# Patient Record
Sex: FEMALE | Race: WHITE | Employment: FULL TIME | URBAN - METROPOLITAN AREA
[De-identification: names, ages, dates, MRNs, and addresses within clinical notes are randomized per-mention and may not be internally consistent; named-entity substitution may affect disease eponyms.]

---

## 2017-03-30 ENCOUNTER — OFFICE VISIT (OUTPATIENT)
Dept: OBGYN CLINIC | Facility: CLINIC | Age: 52
End: 2017-03-30

## 2017-03-30 ENCOUNTER — TELEPHONE (OUTPATIENT)
Dept: OBGYN CLINIC | Facility: CLINIC | Age: 52
End: 2017-03-30

## 2017-03-30 DIAGNOSIS — N39.0 URINARY TRACT INFECTION, SITE UNSPECIFIED: Primary | ICD-10-CM

## 2017-03-30 LAB
APPEARANCE: CLEAR
MULTISTIX LOT#: NORMAL NUMERIC
PH, URINE: 7 (ref 4.5–8)
SPECIFIC GRAVITY: 1.02 (ref 1–1.03)
URINE-COLOR: YELLOW
UROBILINOGEN,SEMI-QN: 0.2 MG/DL (ref 0–1.9)

## 2017-03-30 PROCEDURE — 81002 URINALYSIS NONAUTO W/O SCOPE: CPT | Performed by: OBSTETRICS & GYNECOLOGY

## 2017-03-30 PROCEDURE — 87086 URINE CULTURE/COLONY COUNT: CPT | Performed by: OBSTETRICS & GYNECOLOGY

## 2017-03-30 PROCEDURE — 87186 SC STD MICRODIL/AGAR DIL: CPT | Performed by: OBSTETRICS & GYNECOLOGY

## 2017-03-30 PROCEDURE — 87088 URINE BACTERIA CULTURE: CPT | Performed by: OBSTETRICS & GYNECOLOGY

## 2017-03-30 RX ORDER — NITROFURANTOIN 25; 75 MG/1; MG/1
100 CAPSULE ORAL 2 TIMES DAILY
Qty: 14 CAPSULE | Refills: 0 | Status: SHIPPED | OUTPATIENT
Start: 2017-03-30 | End: 2017-04-06

## 2017-04-14 ENCOUNTER — OFFICE VISIT (OUTPATIENT)
Dept: FAMILY MEDICINE CLINIC | Facility: CLINIC | Age: 52
End: 2017-04-14

## 2017-04-14 VITALS
RESPIRATION RATE: 12 BRPM | WEIGHT: 120 LBS | HEIGHT: 64.75 IN | TEMPERATURE: 99 F | OXYGEN SATURATION: 95 % | SYSTOLIC BLOOD PRESSURE: 128 MMHG | HEART RATE: 72 BPM | DIASTOLIC BLOOD PRESSURE: 88 MMHG | BODY MASS INDEX: 20.24 KG/M2

## 2017-04-14 DIAGNOSIS — Z13.0 SCREENING FOR IRON DEFICIENCY ANEMIA: ICD-10-CM

## 2017-04-14 DIAGNOSIS — Z01.84 IMMUNITY STATUS TESTING: ICD-10-CM

## 2017-04-14 DIAGNOSIS — Z00.00 ROUTINE GENERAL MEDICAL EXAMINATION AT A HEALTH CARE FACILITY: Primary | ICD-10-CM

## 2017-04-14 DIAGNOSIS — Z13.29 SCREENING FOR ENDOCRINE, NUTRITIONAL, METABOLIC AND IMMUNITY DISORDER: ICD-10-CM

## 2017-04-14 DIAGNOSIS — Z13.228 SCREENING FOR ENDOCRINE, NUTRITIONAL, METABOLIC AND IMMUNITY DISORDER: ICD-10-CM

## 2017-04-14 DIAGNOSIS — Z12.11 SCREENING FOR MALIGNANT NEOPLASM OF COLON: ICD-10-CM

## 2017-04-14 DIAGNOSIS — Z13.0 SCREENING FOR ENDOCRINE, NUTRITIONAL, METABOLIC AND IMMUNITY DISORDER: ICD-10-CM

## 2017-04-14 DIAGNOSIS — Z13.6 SCREENING FOR HEART DISEASE: ICD-10-CM

## 2017-04-14 DIAGNOSIS — Z13.21 SCREENING FOR ENDOCRINE, NUTRITIONAL, METABOLIC AND IMMUNITY DISORDER: ICD-10-CM

## 2017-04-14 PROCEDURE — 99396 PREV VISIT EST AGE 40-64: CPT | Performed by: FAMILY MEDICINE

## 2017-04-14 PROCEDURE — 90715 TDAP VACCINE 7 YRS/> IM: CPT | Performed by: FAMILY MEDICINE

## 2017-04-14 PROCEDURE — 90471 IMMUNIZATION ADMIN: CPT | Performed by: FAMILY MEDICINE

## 2017-04-14 RX ORDER — PYRIDOXINE HCL (VITAMIN B6) 100 MG
1 TABLET ORAL DAILY
COMMUNITY
End: 2020-11-14

## 2017-04-14 RX ORDER — GARLIC EXTRACT 500 MG
1 CAPSULE ORAL DAILY
COMMUNITY

## 2017-04-14 RX ORDER — MULTIVITAMIN
1 TABLET ORAL DAILY
COMMUNITY

## 2017-04-14 NOTE — PATIENT INSTRUCTIONS
Please follow-up with:    Dr. Ruvalcaba Seen,  [facility]   Douglas Munguia, [id number]  [address], [address] [place] [address]    Phone: [phone]  Fax: [phone].       Pre © 8376-9767 The [address] [place] [address], [address]. All rights reserved. This information is not intended as a substitute for professional medical care. Always follow your healthcare professional's instructions.         Prevent Certain factors can speed up bone loss or decrease bone growth. For example, a lack of activity makes bones lose their strength. Exercise plays a big part in maintaining bone mass, no matter what your age.  The amount and type of activity you do also play a Cervical cancer All women in this age group, except women who have had a complete hysterectomy Pap test every 3 years or Pap test with human papillomavirus (HPV) test every 5 years   Chlamydia Women at increased risk for infection At routine exams   Colore Hepatitis B Women at increased risk for infection – talk with your healthcare provider 3 doses over 6 months; second dose should be given 1 month after the first dose; the third dose should be given at least 2 months after the second dose and at least 4 mo Use of daily aspirin Women ages 54 and up in this age group who are at risk for cardiovascular health problems such as stroke When your risk is known   Use of tobacco and the health effects it can cause All women in this age group Every exam   1Amerjohn Ca · Being a  baby  Vitamin D has many effects in the body.  You may need this test to help your provider diagnose or treat:  · Problems with the parathyroid gland  · Cancer  · Autoimmune diseases such as multiple sclerosis and Crohn's disease  · Psor The amount of time you spend in the sunlight, your diet, and whether you take vitamin D in supplement form can affect your vitamin D levels. Ask your healthcare provider if any health conditions you have or medicines you take could affect your results.   Shanta Drew · Tell your healthcare provider about any medicines you take. Also tell him or her about any health conditions you may have. · Make sure your rectum and colon are empty for the test. Follow the diet and bowel prep instructions exactly.  If you don’t, the t A camera attached to a flexible tube with a viewing lens is used to take video pictures.      Colonoscopy is a test to view the inside of your lower digestive tract (colon and rectum). Sometimes it can show the last part of the small intestine (ileum). Luz Elena · The healthcare provider will first give you a physical exam to check for anal and rectal problems. · Then the anus is lubricated and the scope inserted. · If you are awake, you may have a feeling similar to needing to have a bowel movement.  You may als

## 2017-04-14 NOTE — PROGRESS NOTES
REASON FOR VISIT:    Rebekah Vincent is a 46year old female who presents for an [address]. No complaints.     Sees Ob/Gyn-Druzak had NL pap 2016 reported- sign release  NL mammogram 10/2016     Last menses 16 was regular prio immunizations at another office such as Influenza, Hepatitis B, Tetanus, or Pneumococcal?: No    Domestic Abuse: No     CAGE:     Cut : No    Annoyed : No    Guilty : No    Eye Opener : No    Scoring  Total Score: 0     Depression Screening (PHQ-2/PHQ-9): No results found for: HCVAB    Tuberculosis Screen if high risk No components found for: PPDINDURAT      Disease Monitoring:    SPECIFIC DISEASE MONITORING Internal Lab or Procedure External Lab or Procedure   Annual Monitoring of Persistent     Medication Smokeless Status: Never Used                        Alcohol Use: Yes           2.4 oz/week       4 Glasses of wine, 0 Standard drinks or equivalent per week       Comment: 3glasses wine q weekend or less    Occ:    :  not dating       REVIEW 1. Routine general medical examination at a health care facility  Healthy-weight  -Encourage healthy diet of whole food and avoid processed food and sugary drinks and sodas.   Diet should include lean meats and vegetables including 5-7 servings of fruit a

## 2017-05-25 ENCOUNTER — MED REC SCAN ONLY (OUTPATIENT)
Dept: OBGYN CLINIC | Facility: CLINIC | Age: 52
End: 2017-05-25

## 2017-07-07 ENCOUNTER — LAB ENCOUNTER (OUTPATIENT)
Dept: LAB | Age: 52
End: 2017-07-07
Attending: FAMILY MEDICINE
Payer: COMMERCIAL

## 2017-07-07 DIAGNOSIS — Z13.29 SCREENING FOR ENDOCRINE, NUTRITIONAL, METABOLIC AND IMMUNITY DISORDER: ICD-10-CM

## 2017-07-07 DIAGNOSIS — Z01.84 IMMUNITY STATUS TESTING: ICD-10-CM

## 2017-07-07 DIAGNOSIS — Z13.0 SCREENING FOR IRON DEFICIENCY ANEMIA: ICD-10-CM

## 2017-07-07 DIAGNOSIS — Z13.21 SCREENING FOR ENDOCRINE, NUTRITIONAL, METABOLIC AND IMMUNITY DISORDER: ICD-10-CM

## 2017-07-07 DIAGNOSIS — Z13.228 SCREENING FOR ENDOCRINE, NUTRITIONAL, METABOLIC AND IMMUNITY DISORDER: ICD-10-CM

## 2017-07-07 DIAGNOSIS — Z00.00 ROUTINE GENERAL MEDICAL EXAMINATION AT A HEALTH CARE FACILITY: ICD-10-CM

## 2017-07-07 DIAGNOSIS — Z13.0 SCREENING FOR ENDOCRINE, NUTRITIONAL, METABOLIC AND IMMUNITY DISORDER: ICD-10-CM

## 2017-07-07 LAB
25-HYDROXYVITAMIN D (TOTAL): 34.6 NG/ML (ref 30–100)
ALBUMIN SERPL-MCNC: 4 G/DL (ref 3.5–4.8)
ALP LIVER SERPL-CCNC: 51 U/L (ref 41–108)
ALT SERPL-CCNC: 22 U/L (ref 14–54)
AST SERPL-CCNC: 18 U/L (ref 15–41)
BASOPHILS # BLD AUTO: 0.08 X10(3) UL (ref 0–0.1)
BASOPHILS NFR BLD AUTO: 1.3 %
BILIRUB SERPL-MCNC: 0.4 MG/DL (ref 0.1–2)
BUN BLD-MCNC: 13 MG/DL (ref 8–20)
CALCIUM BLD-MCNC: 9 MG/DL (ref 8.3–10.3)
CHLORIDE: 104 MMOL/L (ref 101–111)
CO2: 28 MMOL/L (ref 22–32)
CREAT BLD-MCNC: 0.85 MG/DL (ref 0.55–1.02)
EOSINOPHIL # BLD AUTO: 0.1 X10(3) UL (ref 0–0.3)
EOSINOPHIL NFR BLD AUTO: 1.6 %
ERYTHROCYTE [DISTWIDTH] IN BLOOD BY AUTOMATED COUNT: 12.2 % (ref 11.5–16)
GLUCOSE BLD-MCNC: 80 MG/DL (ref 70–99)
HCT VFR BLD AUTO: 40.1 % (ref 34–50)
HGB BLD-MCNC: 13.4 G/DL (ref 12–16)
IMMATURE GRANULOCYTE COUNT: 0.01 X10(3) UL (ref 0–1)
IMMATURE GRANULOCYTE RATIO %: 0.2 %
LYMPHOCYTES # BLD AUTO: 1.11 X10(3) UL (ref 0.9–4)
LYMPHOCYTES NFR BLD AUTO: 18.1 %
M PROTEIN MFR SERPL ELPH: 7.6 G/DL (ref 6.1–8.3)
MCH RBC QN AUTO: 32.2 PG (ref 27–33.2)
MCHC RBC AUTO-ENTMCNC: 33.4 G/DL (ref 31–37)
MCV RBC AUTO: 96.4 FL (ref 81–100)
MONOCYTES # BLD AUTO: 0.4 X10(3) UL (ref 0.1–0.6)
MONOCYTES NFR BLD AUTO: 6.5 %
NEUTROPHIL ABS PRELIM: 4.44 X10 (3) UL (ref 1.3–6.7)
NEUTROPHILS # BLD AUTO: 4.44 X10(3) UL (ref 1.3–6.7)
NEUTROPHILS NFR BLD AUTO: 72.3 %
PLATELET # BLD AUTO: 212 10(3)UL (ref 150–450)
POTASSIUM SERPL-SCNC: 3.9 MMOL/L (ref 3.6–5.1)
RBC # BLD AUTO: 4.16 X10(6)UL (ref 3.8–5.1)
RED CELL DISTRIBUTION WIDTH-SD: 43.1 FL (ref 35.1–46.3)
RUBELLA IGG QUANTITATIVE: >500 IU/ML
RUBV IGG SER QL: POSITIVE
SODIUM SERPL-SCNC: 139 MMOL/L (ref 136–144)
TSI SER-ACNC: 1.72 MIU/ML (ref 0.35–5.5)
WBC # BLD AUTO: 6.1 X10(3) UL (ref 4–13)

## 2017-07-07 PROCEDURE — 80050 GENERAL HEALTH PANEL: CPT | Performed by: FAMILY MEDICINE

## 2017-07-07 PROCEDURE — 36415 COLL VENOUS BLD VENIPUNCTURE: CPT | Performed by: FAMILY MEDICINE

## 2017-07-07 PROCEDURE — 86765 RUBEOLA ANTIBODY: CPT | Performed by: FAMILY MEDICINE

## 2017-07-07 PROCEDURE — 82306 VITAMIN D 25 HYDROXY: CPT | Performed by: FAMILY MEDICINE

## 2017-07-07 PROCEDURE — 86735 MUMPS ANTIBODY: CPT | Performed by: FAMILY MEDICINE

## 2017-07-07 PROCEDURE — 86762 RUBELLA ANTIBODY: CPT | Performed by: FAMILY MEDICINE

## 2017-07-11 LAB — MEV IGG SER IA-ACNC: POSITIVE

## 2017-07-12 LAB — MUV IGG SER IA-ACNC: POSITIVE

## 2017-08-22 ENCOUNTER — OFFICE VISIT (OUTPATIENT)
Dept: OBGYN CLINIC | Facility: CLINIC | Age: 52
End: 2017-08-22

## 2017-08-22 VITALS
RESPIRATION RATE: 16 BRPM | WEIGHT: 124 LBS | HEIGHT: 65 IN | SYSTOLIC BLOOD PRESSURE: 126 MMHG | HEART RATE: 72 BPM | DIASTOLIC BLOOD PRESSURE: 72 MMHG | BODY MASS INDEX: 20.66 KG/M2

## 2017-08-22 DIAGNOSIS — N39.0 URINARY TRACT INFECTION WITHOUT HEMATURIA, SITE UNSPECIFIED: Primary | ICD-10-CM

## 2017-08-22 LAB
APPEARANCE: CLEAR
BILIRUBIN: NEGATIVE
GLUCOSE (URINE DIPSTICK): NEGATIVE MG/DL
KETONES (URINE DIPSTICK): NEGATIVE MG/DL
LEUKOCYTES: NEGATIVE
MULTISTIX LOT#: NORMAL NUMERIC
NITRITE, URINE: NEGATIVE
PH, URINE: 7 (ref 4.5–8)
PROTEIN (URINE DIPSTICK): NEGATIVE MG/DL
SPECIFIC GRAVITY: 1.01 (ref 1–1.03)
URINE-COLOR: YELLOW
UROBILINOGEN,SEMI-QN: 0.2 MG/DL (ref 0–1.9)

## 2017-08-22 PROCEDURE — 99213 OFFICE O/P EST LOW 20 MIN: CPT | Performed by: OBSTETRICS & GYNECOLOGY

## 2017-08-22 PROCEDURE — 81003 URINALYSIS AUTO W/O SCOPE: CPT | Performed by: OBSTETRICS & GYNECOLOGY

## 2017-08-22 PROCEDURE — 87086 URINE CULTURE/COLONY COUNT: CPT | Performed by: OBSTETRICS & GYNECOLOGY

## 2017-08-22 RX ORDER — NITROFURANTOIN MACROCRYSTALS 50 MG/1
50 CAPSULE ORAL NIGHTLY
Qty: 30 CAPSULE | Refills: 4 | Status: SHIPPED | OUTPATIENT
Start: 2017-08-22 | End: 2017-10-10

## 2017-08-22 NOTE — PROGRESS NOTES
She is having bladder infections every 3-4 weeks. She test home urine it comes up positive. Bactrim does not work for her anymore she uses Cipro. Urine culture will be sent. We discussed antibiotic suppression with Macrobid which she desires.   Also Ben Montiel

## 2017-08-25 ENCOUNTER — MED REC SCAN ONLY (OUTPATIENT)
Dept: OBGYN CLINIC | Facility: CLINIC | Age: 52
End: 2017-08-25

## 2017-10-10 ENCOUNTER — OFFICE VISIT (OUTPATIENT)
Dept: OBGYN CLINIC | Facility: CLINIC | Age: 52
End: 2017-10-10

## 2017-10-10 VITALS
SYSTOLIC BLOOD PRESSURE: 124 MMHG | BODY MASS INDEX: 19.99 KG/M2 | HEIGHT: 65 IN | HEART RATE: 80 BPM | WEIGHT: 120 LBS | DIASTOLIC BLOOD PRESSURE: 74 MMHG

## 2017-10-10 DIAGNOSIS — Z01.419 WELL WOMAN EXAM WITH ROUTINE GYNECOLOGICAL EXAM: Primary | ICD-10-CM

## 2017-10-10 DIAGNOSIS — Z30.41 ENCOUNTER FOR SURVEILLANCE OF CONTRACEPTIVE PILLS: ICD-10-CM

## 2017-10-10 PROCEDURE — 99396 PREV VISIT EST AGE 40-64: CPT | Performed by: OBSTETRICS & GYNECOLOGY

## 2017-10-10 RX ORDER — NITROFURANTOIN MACROCRYSTALS 50 MG/1
50 CAPSULE ORAL NIGHTLY
Qty: 30 CAPSULE | Refills: 4 | Status: SHIPPED | OUTPATIENT
Start: 2017-10-10 | End: 2018-10-23

## 2017-10-10 RX ORDER — NORETHINDRONE ACETATE AND ETHINYL ESTRADIOL AND FERROUS FUMARATE 1MG-20(24)
1 KIT ORAL DAILY
Qty: 90 TABLET | Refills: 4 | Status: SHIPPED | OUTPATIENT
Start: 2017-10-10 | End: 2017-12-13

## 2017-10-10 NOTE — PROGRESS NOTES
Colton Madrigal is a 46year old female  Patient's last menstrual period was 2017 (exact date). Patient presents with:  Wellness Visit  . She is on the birth control pill. She usually does not have a. She did have one last year.   Has hot f • Diabetes Father    • High Blood Pressure Mother    • Heart Disease Paternal Grandfather    • Other[other] [OTHER] Maternal Grandmother      osteoporosis       MEDICATIONS:    Current Outpatient Prescriptions:   •  Nitrofurantoin Macrocrystal (MACRODANT Normal appearance without lesions, no abnormal discharge good estrogen. Cervix:  Normal without tenderness on motion without lesions. Uterus: normal in size, contour, position, mobility, without tenderness no lesions.   Adnexa: normal without masses or te

## 2017-10-16 ENCOUNTER — TELEPHONE (OUTPATIENT)
Dept: OBGYN CLINIC | Facility: CLINIC | Age: 52
End: 2017-10-16

## 2017-10-16 NOTE — TELEPHONE ENCOUNTER
Pt is requesting generic version of Minastrin ( Mibelaf chewable)  sent to pharm on file.  Pt was in for her wwe on 10-

## 2017-11-07 ENCOUNTER — PATIENT MESSAGE (OUTPATIENT)
Dept: OBGYN CLINIC | Facility: CLINIC | Age: 52
End: 2017-11-07

## 2017-11-07 DIAGNOSIS — Z12.31 VISIT FOR SCREENING MAMMOGRAM: Primary | ICD-10-CM

## 2017-11-07 NOTE — TELEPHONE ENCOUNTER
From: Vale Ends  To: Rogerio Bañuelos MD  Sent: 11/7/2017 1:25 PM CST  Subject: Other    I need to set up appt for my annual mammogram but I need an order set up in the system so I can schedule with the main hospital. Please let me know if we can do t

## 2017-12-09 ENCOUNTER — HOSPITAL ENCOUNTER (OUTPATIENT)
Dept: MAMMOGRAPHY | Facility: HOSPITAL | Age: 52
Discharge: HOME | End: 2017-12-09
Attending: OBSTETRICS & GYNECOLOGY
Payer: COMMERCIAL

## 2017-12-09 DIAGNOSIS — Z12.31 VISIT FOR SCREENING MAMMOGRAM: ICD-10-CM

## 2017-12-09 PROCEDURE — 77067 SCR MAMMO BI INCL CAD: CPT | Performed by: OBSTETRICS & GYNECOLOGY

## 2017-12-13 RX ORDER — NORETHINDRONE ACETATE, ETHINYL ESTRADIOL AND FERROUS FUMARATE 1MG-20(24)
KIT ORAL
Qty: 84 TABLET | Refills: 3 | Status: SHIPPED | OUTPATIENT
Start: 2017-12-13 | End: 2018-11-28

## 2017-12-21 ENCOUNTER — HOSPITAL ENCOUNTER (OUTPATIENT)
Dept: MAMMOGRAPHY | Age: 52
Discharge: HOME | End: 2017-12-21
Attending: OBSTETRICS & GYNECOLOGY
Payer: COMMERCIAL

## 2017-12-21 ENCOUNTER — HOSPITAL ENCOUNTER (OUTPATIENT)
Dept: ULTRASOUND IMAGING | Age: 52
Discharge: HOME | End: 2017-12-21
Attending: OBSTETRICS & GYNECOLOGY
Payer: COMMERCIAL

## 2017-12-21 DIAGNOSIS — R92.8 ABNORMAL MAMMOGRAM: ICD-10-CM

## 2017-12-21 DIAGNOSIS — R92.8 ABNORMAL MAMMOGRAM OF LEFT BREAST: ICD-10-CM

## 2017-12-21 PROCEDURE — 77065 DX MAMMO INCL CAD UNI: CPT | Performed by: OBSTETRICS & GYNECOLOGY

## 2017-12-21 PROCEDURE — 76642 ULTRASOUND BREAST LIMITED: CPT | Performed by: OBSTETRICS & GYNECOLOGY

## 2017-12-21 PROCEDURE — 77061 BREAST TOMOSYNTHESIS UNI: CPT | Performed by: OBSTETRICS & GYNECOLOGY

## 2018-07-30 ENCOUNTER — OFFICE VISIT (OUTPATIENT)
Dept: OBGYN CLINIC | Facility: CLINIC | Age: 53
End: 2018-07-30
Payer: COMMERCIAL

## 2018-07-30 VITALS
RESPIRATION RATE: 16 BRPM | DIASTOLIC BLOOD PRESSURE: 62 MMHG | HEIGHT: 65 IN | WEIGHT: 122 LBS | SYSTOLIC BLOOD PRESSURE: 120 MMHG | HEART RATE: 64 BPM | BODY MASS INDEX: 20.33 KG/M2

## 2018-07-30 DIAGNOSIS — R10.2 PELVIC PAIN: Primary | ICD-10-CM

## 2018-07-30 PROCEDURE — 99213 OFFICE O/P EST LOW 20 MIN: CPT | Performed by: OBSTETRICS & GYNECOLOGY

## 2018-07-31 ENCOUNTER — APPOINTMENT (OUTPATIENT)
Dept: OBGYN CLINIC | Facility: CLINIC | Age: 53
End: 2018-07-31
Payer: COMMERCIAL

## 2018-07-31 NOTE — PROGRESS NOTES
Khadijah Miller is a 46year old female [id number] Patient's last menstrual period was 07/16/2018 (approximate). Patient presents with:  Vaginal Problem: pelvic/abd aching pain/bloating x 1 week.    .Patient has been taking OCP continuously, but lost month to History Narrative   None on file       FAMILY HISTORY:  Family History   Problem Relation Age of Onset   • Heart Disease Father    • Diabetes Father    • High Blood Pressure Mother    • Heart Disease Paternal Grandfather    • Other[other] [OTHER] Maternal

## 2018-08-03 ENCOUNTER — TELEPHONE (OUTPATIENT)
Dept: OBGYN CLINIC | Facility: CLINIC | Age: 53
End: 2018-08-03

## 2018-08-06 ENCOUNTER — MED REC SCAN ONLY (OUTPATIENT)
Dept: OBGYN CLINIC | Facility: CLINIC | Age: 53
End: 2018-08-06

## 2018-08-06 ENCOUNTER — PATIENT MESSAGE (OUTPATIENT)
Dept: OBGYN CLINIC | Facility: CLINIC | Age: 53
End: 2018-08-06

## 2018-08-07 ENCOUNTER — PATIENT MESSAGE (OUTPATIENT)
Dept: FAMILY MEDICINE CLINIC | Facility: CLINIC | Age: 53
End: 2018-08-07

## 2018-08-07 NOTE — TELEPHONE ENCOUNTER
From: Anurag Head  To: Debbi Ward DO  Sent: 8/7/2018 11:39 AM CDT  Subject: Non-Urgent Medical Question    Hi!  Wondering if you could offer your opinion on test results from 7/31, had ultrasound & OB/GYN found nothing to really confirm where pain

## 2018-08-08 NOTE — TELEPHONE ENCOUNTER
From: Yonas Hughes  To: Ana María Polanco MD  Sent: 8/6/2018 1:07 PM CDT  Subject: Test Results Question    Hi!!  I was feeling some uncomfortable pain that did not feel quite right during the week of 7/23.  I was concerned about tumors or what not - given

## 2018-08-08 NOTE — TELEPHONE ENCOUNTER
Please call patient and inform the following:  I agree that 3 small fibroids would not cause bloating x 10 days. The uterus and ovaries are otherwise normal. Often, the bowels can have a similar sensation of pain and pelvic discomfort as the ovaries.   It c

## 2018-08-09 NOTE — TELEPHONE ENCOUNTER
Spoke with pt, reviewed results and recommendations. Pt declines appointment at this time, she denies bowel or bladder symptoms at this time.  Pt will call with questions or concerns

## 2018-10-01 ENCOUNTER — OFFICE VISIT (OUTPATIENT)
Dept: FAMILY MEDICINE CLINIC | Facility: CLINIC | Age: 53
End: 2018-10-01
Payer: COMMERCIAL

## 2018-10-01 VITALS
RESPIRATION RATE: 18 BRPM | WEIGHT: 120 LBS | HEIGHT: 64.5 IN | HEART RATE: 61 BPM | TEMPERATURE: 98 F | DIASTOLIC BLOOD PRESSURE: 78 MMHG | SYSTOLIC BLOOD PRESSURE: 116 MMHG | OXYGEN SATURATION: 98 % | BODY MASS INDEX: 20.24 KG/M2

## 2018-10-01 DIAGNOSIS — B07.8 OTHER VIRAL WARTS: ICD-10-CM

## 2018-10-01 DIAGNOSIS — Z13.21 SCREENING FOR ENDOCRINE, NUTRITIONAL, METABOLIC AND IMMUNITY DISORDER: ICD-10-CM

## 2018-10-01 DIAGNOSIS — Z13.228 SCREENING FOR ENDOCRINE, NUTRITIONAL, METABOLIC AND IMMUNITY DISORDER: ICD-10-CM

## 2018-10-01 DIAGNOSIS — Z13.0 SCREENING FOR ENDOCRINE, NUTRITIONAL, METABOLIC AND IMMUNITY DISORDER: ICD-10-CM

## 2018-10-01 DIAGNOSIS — Z12.39 SCREENING FOR MALIGNANT NEOPLASM OF BREAST: ICD-10-CM

## 2018-10-01 DIAGNOSIS — Z13.29 SCREENING FOR ENDOCRINE, NUTRITIONAL, METABOLIC AND IMMUNITY DISORDER: ICD-10-CM

## 2018-10-01 DIAGNOSIS — R92.2 DENSE BREAST: ICD-10-CM

## 2018-10-01 DIAGNOSIS — Z13.6 SCREENING FOR HEART DISEASE: ICD-10-CM

## 2018-10-01 DIAGNOSIS — Z00.00 ANNUAL PHYSICAL EXAM: Primary | ICD-10-CM

## 2018-10-01 DIAGNOSIS — Z13.0 SCREENING FOR IRON DEFICIENCY ANEMIA: ICD-10-CM

## 2018-10-01 PROBLEM — B07.9 CUTANEOUS WART: Status: ACTIVE | Noted: 2018-10-01

## 2018-10-01 PROCEDURE — 99396 PREV VISIT EST AGE 40-64: CPT | Performed by: FAMILY MEDICINE

## 2018-10-01 RX ORDER — IMIQUIMOD 12.5 MG/.25G
1 CREAM TOPICAL NIGHTLY
Qty: 24 EACH | Refills: 0 | Status: SHIPPED | OUTPATIENT
Start: 2018-10-01 | End: 2019-10-17 | Stop reason: ALTCHOICE

## 2018-10-02 NOTE — PROGRESS NOTES
REASON FOR VISIT:    Kristopher Galvan is a 46year old female who presents for an [address].     Complains of wart on right hand, tried OTC freezing and meds, no relief    Boyfriend, monogamous x 2 year, on ocp managed by Gyn   7/2016 lb  07/30/18 : 122 lb  05/10/18 : 118 lb  10/10/17 : 120 lb  08/22/17 : 124 lb  04/14/17 : 120 lb    Body mass index is 20.28 kg/m².     No results found for: GLUCOSE  Lab Results   Component Value Date    CHOLEST 157 12/01/2016     Lab Results   Component Sigmoidoscopy Screen  Every 5 years No results found for this or any previous visit. Fecal Occult Blood  Annually No results found for: FOB, OCCULTSTOOL    Obesity Screening Screen all adults annually Body mass index is 20.28 kg/m².       Preventive Serv (Annually between Nov. 1 & Dec. 31)    Date of last AAP/ACT and counseling given on importance of controller meds.                  ALLERGIES:   No Known Allergies    CURRENT MEDICATIONS:     Current Outpatient Medications:  PEG 3350-KCl-NaBcb-NaCl-NaSulf ( Used      Tobacco comment: na    Alcohol use:  Yes      Alcohol/week: 2.4 oz      Types: 4 Glasses of wine per week      Comment: 3glasses wine q weekend or less    Drug use: No    Occ:    :  not dating       REVIEW OF SYSTEMS:   GENERAL: feel CONDITIONS:   Sacha Segal is a 46year old female who presents for an [address]. PLAN SUMMARY:   1.  Routine general medical examination at a health care facility  Healthy-weight normal BMI  -Encourage healthy diet of whole food and Date(s) Administered   • TDAP 04/14/2017   • Varicella Deferred (Had Chicken Pox) 10/26/1970       Influenza Annually   Pneumococcal if high risk   Td/Tdap once then every 10 years   HPV Females 11-26: 3 doses   Zoster (Shingles) 60 and older: one dose   V

## 2018-10-05 ENCOUNTER — TELEPHONE (OUTPATIENT)
Dept: OBGYN CLINIC | Facility: CLINIC | Age: 53
End: 2018-10-05

## 2018-10-05 ENCOUNTER — TELEPHONE (OUTPATIENT)
Dept: FAMILY MEDICINE CLINIC | Facility: CLINIC | Age: 53
End: 2018-10-05

## 2018-10-05 NOTE — TELEPHONE ENCOUNTER
CHART SENT TO SCAN DEPT PER PT .REQUESTING RECORDS TO BE SENT TO [facility] Marc Rouse [id number]. P# [phone] & F # [phone]

## 2018-10-05 NOTE — TELEPHONE ENCOUNTER
Patient signed HIPAA medical records authorization form for the Facility identified below to disclose patient health information to Noni Phelps / Provider Name: Dr. Saundra Han Address: [address] [place] [address].  [id number]  Facility Phone: [phone]

## 2018-10-23 ENCOUNTER — OFFICE VISIT (OUTPATIENT)
Dept: OBGYN CLINIC | Facility: CLINIC | Age: 53
End: 2018-10-23
Payer: COMMERCIAL

## 2018-10-23 VITALS
SYSTOLIC BLOOD PRESSURE: 128 MMHG | HEART RATE: 72 BPM | BODY MASS INDEX: 20.4 KG/M2 | DIASTOLIC BLOOD PRESSURE: 70 MMHG | WEIGHT: 121 LBS | HEIGHT: 64.5 IN

## 2018-10-23 DIAGNOSIS — Z12.39 SPECIAL SCREENING EXAMINATION FOR NEOPLASM OF BREAST: ICD-10-CM

## 2018-10-23 DIAGNOSIS — Z01.419 WELL WOMAN EXAM WITH ROUTINE GYNECOLOGICAL EXAM: Primary | ICD-10-CM

## 2018-10-23 PROCEDURE — 99396 PREV VISIT EST AGE 40-64: CPT | Performed by: OBSTETRICS & GYNECOLOGY

## 2018-10-23 RX ORDER — NITROFURANTOIN MACROCRYSTALS 50 MG/1
50 CAPSULE ORAL NIGHTLY
Qty: 30 CAPSULE | Refills: 4 | Status: SHIPPED | OUTPATIENT
Start: 2018-10-23 | End: 2019-10-17

## 2018-10-23 NOTE — PROGRESS NOTES
Cordell Wynn is a 46year old female [id number] Patient's last menstrual period was 09/26/2018 (exact date). Patient presents with:  Wellness Visit  . She is on Loestrin 120 has very few bleeding days rare hot flashes or night sweats.   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 , Disp: , Rfl:   •  Cranberry 500 MG Oral Cap, Take 1 tablet by mouth daily. , Disp: , Rfl:   •  Acidophilus/Pectin Oral Cap, Take 1 capsule by mouth daily. , D

## 2018-11-28 RX ORDER — NORETHINDRONE ACETATE AND ETHINYL ESTRADIOL AND FERROUS FUMARATE 1MG-20(24)
KIT ORAL
Qty: 84 TABLET | Refills: 3 | Status: SHIPPED | OUTPATIENT
Start: 2018-11-28 | End: 2019-04-15 | Stop reason: ALTCHOICE

## 2018-12-26 ENCOUNTER — HOSPITAL ENCOUNTER (OUTPATIENT)
Dept: MAMMOGRAPHY | Facility: HOSPITAL | Age: 53
Discharge: HOME | End: 2018-12-26
Attending: OBSTETRICS & GYNECOLOGY
Payer: COMMERCIAL

## 2018-12-26 DIAGNOSIS — Z12.39 SPECIAL SCREENING EXAMINATION FOR NEOPLASM OF BREAST: ICD-10-CM

## 2018-12-26 PROCEDURE — 77067 SCR MAMMO BI INCL CAD: CPT | Performed by: OBSTETRICS & GYNECOLOGY

## 2018-12-26 PROCEDURE — 77063 BREAST TOMOSYNTHESIS BI: CPT | Performed by: OBSTETRICS & GYNECOLOGY

## 2019-01-14 NOTE — TELEPHONE ENCOUNTER
Received 2 CD's with medical records. No pap smear report on file.  Will contact Dr. Dory Rodríguez office

## 2019-01-14 NOTE — TELEPHONE ENCOUNTER
Contacted Dr. Shell Quant office requesting most recent pap result. Kaley fax number. They will fax over most recent results.

## 2019-01-15 ENCOUNTER — MED REC SCAN ONLY (OUTPATIENT)
Dept: FAMILY MEDICINE CLINIC | Facility: CLINIC | Age: 54
End: 2019-01-15

## 2019-04-15 ENCOUNTER — OFFICE VISIT (OUTPATIENT)
Dept: FAMILY MEDICINE CLINIC | Facility: CLINIC | Age: 54
End: 2019-04-15
Payer: COMMERCIAL

## 2019-04-15 VITALS
BODY MASS INDEX: 20.36 KG/M2 | TEMPERATURE: 99 F | HEIGHT: 65 IN | SYSTOLIC BLOOD PRESSURE: 144 MMHG | OXYGEN SATURATION: 96 % | WEIGHT: 122.19 LBS | RESPIRATION RATE: 16 BRPM | HEART RATE: 77 BPM | DIASTOLIC BLOOD PRESSURE: 88 MMHG

## 2019-04-15 DIAGNOSIS — B07.8 OTHER VIRAL WARTS: Primary | ICD-10-CM

## 2019-04-15 DIAGNOSIS — R03.0 ELEVATED BLOOD PRESSURE READING IN OFFICE WITHOUT DIAGNOSIS OF HYPERTENSION: ICD-10-CM

## 2019-04-15 PROCEDURE — 99212 OFFICE O/P EST SF 10 MIN: CPT | Performed by: FAMILY MEDICINE

## 2019-04-15 PROCEDURE — 17110 DESTRUCTION B9 LES UP TO 14: CPT | Performed by: FAMILY MEDICINE

## 2019-04-15 RX ORDER — NORETHINDRONE ACETATE AND ETHINYL ESTRADIOL, ETHINYL ESTRADIOL AND FERROUS FUMARATE 1MG-10(24)
1 KIT ORAL
Refills: 11 | COMMUNITY
Start: 2019-03-19 | End: 2019-10-17

## 2019-04-15 NOTE — PATIENT INSTRUCTIONS
As of October 6th 2014, the Drug Enforcement Agency [facility]) is reclassifying all hydrocodone combination medications from Schedule III to Schedule II. This includes medications such as Norco, Vicodin, Lortab, Zohydro, and Vicoprofen.      What this means for air at night. Once skin has healed and blood blister has resolved then may remove Band-Aid during day. · Do not pop any blister that forms post cryotherapy. Blood blisters are extremely common and will resolve within 1-2 weeks.   · Open skin leads to hig pressure makes plantar warts painful. When they form in clusters, plantar warts are called mosaic warts. · Periungual warts. These form under and around fingernails. People who bite their nails are more at risk. · Filiform warts.  These are slender, finge at home. · Over-the-counter (OTC) topical treatments. OTC medicines that most often contain salicylic acid may be an option. These patches, liquids, and creams are used at home. The medicine is applied daily to the wart and nearby skin.  It's usually left medical care. Always follow your healthcare professional's instructions.

## 2019-04-16 NOTE — PROGRESS NOTES
CHIEF COMPLAINT: Patient presents with:  Derm Problem: lesions on Right hand x 1 year    HPI:     Joy Baltazar is a 48year old female presents for wart tx. 3 warts on right hand started 1 year ago as one larger on palmar side of hand.  tx otc treatment capsule (50 mg total) by mouth nightly. Disp: 30 capsule Rfl: 4   Imiquimod 5 % External Cream Apply 1 Application topically nightly. Wash hands after use Disp: 24 each Rfl: 0   Multiple Vitamin (MULTI-VITAMIN DAILY) Oral Tab Take 1 tablet by mouth daily. bleeding. Band-Aid applied. LABS        REVIEWED THIS VISIT  ASSESSMENT/PLAN:   48year old female with    1. Other viral warts  Wound care discussed  Signs and symptoms of infection reviewed.    observe for signs of infection such as increased redn

## 2019-10-17 ENCOUNTER — OFFICE VISIT (OUTPATIENT)
Dept: OBGYN CLINIC | Facility: CLINIC | Age: 54
End: 2019-10-17
Payer: COMMERCIAL

## 2019-10-17 VITALS
BODY MASS INDEX: 20.49 KG/M2 | WEIGHT: 123 LBS | DIASTOLIC BLOOD PRESSURE: 76 MMHG | SYSTOLIC BLOOD PRESSURE: 130 MMHG | HEIGHT: 65 IN

## 2019-10-17 DIAGNOSIS — Z12.31 ENCOUNTER FOR SCREENING MAMMOGRAM FOR MALIGNANT NEOPLASM OF BREAST: Primary | ICD-10-CM

## 2019-10-17 DIAGNOSIS — Z01.419 WELL WOMAN EXAM WITH ROUTINE GYNECOLOGICAL EXAM: ICD-10-CM

## 2019-10-17 DIAGNOSIS — Z12.4 SCREENING FOR MALIGNANT NEOPLASM OF CERVIX: ICD-10-CM

## 2019-10-17 PROCEDURE — 99396 PREV VISIT EST AGE 40-64: CPT | Performed by: OBSTETRICS & GYNECOLOGY

## 2019-10-17 PROCEDURE — 88175 CYTOPATH C/V AUTO FLUID REDO: CPT | Performed by: OBSTETRICS & GYNECOLOGY

## 2019-10-17 RX ORDER — NITROFURANTOIN MACROCRYSTALS 50 MG/1
50 CAPSULE ORAL NIGHTLY
Qty: 30 CAPSULE | Refills: 4 | Status: SHIPPED | OUTPATIENT
Start: 2019-10-17 | End: 2020-10-16

## 2019-10-17 RX ORDER — NORETHINDRONE ACETATE AND ETHINYL ESTRADIOL, ETHINYL ESTRADIOL AND FERROUS FUMARATE 1MG-10(24)
1 KIT ORAL
Qty: 3 PACKAGE | Refills: 5 | Status: SHIPPED | OUTPATIENT
Start: 2019-10-17 | End: 2020-10-13

## 2019-10-17 NOTE — PROGRESS NOTES
Gar Felty is a 48year old female [id number] Patient's last menstrual period was 08/19/2019 (exact date). Patient presents with:  Wellness Visit  . She is still on low Loestrin she has a period about every other month no hot flashes or night sweats. Medical: Not on file        Non-medical: Not on file    Tobacco Use      Smoking status: Never Smoker      Smokeless tobacco: Never Used      Tobacco comment: na    Substance and Sexual Activity      Alcohol use:  Yes        Alcohol/week: 4.0 standard drink osteoporosis       MEDICATIONS:    Current Outpatient Medications:   •  Nitrofurantoin Macrocrystal (MACRODANTIN) 50 MG Oral Cap, Take 1 capsule (50 mg total) by mouth nightly., Disp: 30 capsule, Rfl: 4  •  LO LOESTRIN FE 1 MG-10 MCG / 10 MCG Oral Tab, contour, position, mobility, without tenderness  Adnexa: normal without masses or tenderness  Perineum: normal  Anus: no hemorroids     Assessment & Plan:  Marian Primrose was seen today for wellness visit.     Diagnoses and all orders for this visit:    Encounter fo

## 2019-12-12 RX ORDER — NORETHINDRONE ACETATE AND ETHINYL ESTRADIOL, ETHINYL ESTRADIOL AND FERROUS FUMARATE 1MG-10(24)
KIT ORAL
Qty: 84 TABLET | Refills: 3 | OUTPATIENT
Start: 2019-12-12

## 2019-12-26 ENCOUNTER — HOSPITAL ENCOUNTER (OUTPATIENT)
Dept: MAMMOGRAPHY | Facility: HOSPITAL | Age: 54
Discharge: HOME | End: 2019-12-26
Attending: OBSTETRICS & GYNECOLOGY
Payer: COMMERCIAL

## 2019-12-26 DIAGNOSIS — Z12.31 ENCOUNTER FOR SCREENING MAMMOGRAM FOR MALIGNANT NEOPLASM OF BREAST: ICD-10-CM

## 2019-12-26 PROCEDURE — 77063 BREAST TOMOSYNTHESIS BI: CPT | Performed by: OBSTETRICS & GYNECOLOGY

## 2019-12-26 PROCEDURE — 77067 SCR MAMMO BI INCL CAD: CPT | Performed by: OBSTETRICS & GYNECOLOGY

## 2020-01-06 ENCOUNTER — HOSPITAL ENCOUNTER (OUTPATIENT)
Dept: MAMMOGRAPHY | Facility: HOSPITAL | Age: 55
Discharge: HOME | End: 2020-01-06
Attending: OBSTETRICS & GYNECOLOGY
Payer: COMMERCIAL

## 2020-01-06 DIAGNOSIS — R92.2 INCONCLUSIVE MAMMOGRAM: ICD-10-CM

## 2020-01-06 PROCEDURE — 76642 ULTRASOUND BREAST LIMITED: CPT | Performed by: OBSTETRICS & GYNECOLOGY

## 2020-01-06 PROCEDURE — 77061 BREAST TOMOSYNTHESIS UNI: CPT | Performed by: OBSTETRICS & GYNECOLOGY

## 2020-01-06 PROCEDURE — 77065 DX MAMMO INCL CAD UNI: CPT | Performed by: OBSTETRICS & GYNECOLOGY

## 2020-02-18 ENCOUNTER — TELEPHONE (OUTPATIENT)
Dept: OBGYN CLINIC | Facility: CLINIC | Age: 55
End: 2020-02-18

## 2020-02-18 NOTE — TELEPHONE ENCOUNTER
Per pt she's receiving a bill from her insurance stating her mammo is not covered due to coding. pt stats she spoke w/ the billing dept. the billing dept informed her to call the office .

## 2020-02-25 NOTE — TELEPHONE ENCOUNTER
states billing office told her new legislation staring 1/1/20 requires diagnostic mammos to be paid if coded a certain way. Will look into this further.

## 2020-03-06 NOTE — TELEPHONE ENCOUNTER
Patient informed that it appears to be correct that new legislation has gone into effect as of Jan 1 that most insurance companies are require to cover all screening and diagnostic mammograms, and that it would be an issue to take up with her insurance com

## 2020-07-23 ENCOUNTER — OFFICE VISIT (OUTPATIENT)
Dept: FAMILY MEDICINE CLINIC | Facility: CLINIC | Age: 55
End: 2020-07-23
Payer: COMMERCIAL

## 2020-07-23 VITALS
WEIGHT: 118.19 LBS | HEART RATE: 92 BPM | SYSTOLIC BLOOD PRESSURE: 146 MMHG | OXYGEN SATURATION: 98 % | BODY MASS INDEX: 19.69 KG/M2 | TEMPERATURE: 98 F | RESPIRATION RATE: 18 BRPM | HEIGHT: 65 IN | DIASTOLIC BLOOD PRESSURE: 78 MMHG

## 2020-07-23 DIAGNOSIS — M77.8 LEFT ELBOW TENDONITIS: Primary | ICD-10-CM

## 2020-07-23 DIAGNOSIS — R03.0 ELEVATED BLOOD PRESSURE READING IN OFFICE WITHOUT DIAGNOSIS OF HYPERTENSION: ICD-10-CM

## 2020-07-23 PROCEDURE — 3077F SYST BP >= 140 MM HG: CPT | Performed by: FAMILY MEDICINE

## 2020-07-23 PROCEDURE — 3008F BODY MASS INDEX DOCD: CPT | Performed by: FAMILY MEDICINE

## 2020-07-23 PROCEDURE — 99213 OFFICE O/P EST LOW 20 MIN: CPT | Performed by: FAMILY MEDICINE

## 2020-07-23 PROCEDURE — 3078F DIAST BP <80 MM HG: CPT | Performed by: FAMILY MEDICINE

## 2020-07-23 RX ORDER — MELOXICAM 15 MG/1
15 TABLET ORAL DAILY
Qty: 30 TABLET | Refills: 0 | Status: SHIPPED | OUTPATIENT
Start: 2020-07-23 | End: 2020-08-17

## 2020-07-23 NOTE — PATIENT INSTRUCTIONS
Ergonomics: Your Work Area    Is your workstation arranged so you can work efficiently? That means having your monitor, keyboard, mouse, and workstation tools—such as your telephone and document corona—well placed.  When they are, you'll feel better and m substitute for professional medical care. Always follow your healthcare professional's instructions.

## 2020-07-23 NOTE — PROGRESS NOTES
CHIEF COMPLAINT: Patient presents with: Other: Patient us c/o pain in left elbow x2 months     HPI:     Khadijah Miller is a 47year old female presents for *left elbow pain x 2 mos.  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 Normal range of motion of  wrist and shoulder. Point Tenderness: left lateral elbow epi. Diffuse pain over lateral epicondyle and posterior right elbow and humerus.   Grasp strength as a +5/5 bilateral Case: [id number]                                  Authorizing Provider:  Mani King MD          Collected:           10/17/2019 05:18 PM          Ordering Location:     THE [facility] OF [facility]       Received:            10/18/2019 12:01 AM Gel 150 g 0     Sig: Apply 2 g topically 4 (four) times daily.  4 times daily       Health Maintenance:  Annual Depression Screen due on 10/17/2020  Annual Physical due on 10/17/2020  Influenza Vaccine(1) due on 09/01/2020  Mammogram due on 01/06/2021  Pap

## 2020-08-14 DIAGNOSIS — M77.8 LEFT ELBOW TENDONITIS: ICD-10-CM

## 2020-08-18 RX ORDER — MELOXICAM 15 MG/1
15 TABLET ORAL DAILY
Qty: 30 TABLET | Refills: 0 | OUTPATIENT
Start: 2020-08-18

## 2020-09-16 DIAGNOSIS — M77.8 LEFT ELBOW TENDONITIS: ICD-10-CM

## 2020-09-16 RX ORDER — MELOXICAM 15 MG/1
15 TABLET ORAL DAILY
Qty: 30 TABLET | Refills: 0 | OUTPATIENT
Start: 2020-09-16

## 2020-09-16 NOTE — TELEPHONE ENCOUNTER
Requested Prescriptions     Refused Prescriptions Disp Refills   • MELOXICAM 15 MG Oral Tab [Pharmacy Med Name: MELOXICAM 15 MG TABLET] 30 tablet 0     Sig: TAKE 1 TABLET (15 MG TOTAL) BY MOUTH DAILY. TAKE WITH FOOD AND WATER.  STOP IF GI SIDE EFFECTS     R

## 2020-10-13 ENCOUNTER — TELEPHONE (OUTPATIENT)
Dept: OBGYN CLINIC | Facility: CLINIC | Age: 55
End: 2020-10-13

## 2020-10-13 RX ORDER — NORETHINDRONE ACETATE AND ETHINYL ESTRADIOL, ETHINYL ESTRADIOL AND FERROUS FUMARATE 1MG-10(24)
1 KIT ORAL
Qty: 3 PACKAGE | Refills: 0 | Status: SHIPPED | OUTPATIENT
Start: 2020-10-13 | End: 2021-01-20

## 2020-10-13 NOTE — TELEPHONE ENCOUNTER
Per pt she thinks that she still have some refills for one of her prescriptions the lo string???? And the [facility] pharmacy says this prescription expires this week, but the pt says it does not.  Could you please call and extend until November when she will see

## 2020-10-16 RX ORDER — NITROFURANTOIN MACROCRYSTALS 50 MG/1
CAPSULE ORAL
Qty: 30 CAPSULE | Refills: 1 | Status: SHIPPED | OUTPATIENT
Start: 2020-10-16 | End: 2021-12-06 | Stop reason: ALTCHOICE

## 2020-11-14 ENCOUNTER — OFFICE VISIT (OUTPATIENT)
Dept: OBGYN CLINIC | Facility: CLINIC | Age: 55
End: 2020-11-14
Payer: COMMERCIAL

## 2020-11-14 VITALS
HEART RATE: 96 BPM | WEIGHT: 121 LBS | DIASTOLIC BLOOD PRESSURE: 82 MMHG | SYSTOLIC BLOOD PRESSURE: 136 MMHG | HEIGHT: 65 IN | BODY MASS INDEX: 20.16 KG/M2

## 2020-11-14 DIAGNOSIS — Z01.419 WELL WOMAN EXAM WITH ROUTINE GYNECOLOGICAL EXAM: ICD-10-CM

## 2020-11-14 DIAGNOSIS — N91.2 AMENORRHEA: ICD-10-CM

## 2020-11-14 DIAGNOSIS — Z12.31 ENCOUNTER FOR SCREENING MAMMOGRAM FOR BREAST CANCER: Primary | ICD-10-CM

## 2020-11-14 PROCEDURE — 3075F SYST BP GE 130 - 139MM HG: CPT | Performed by: OBSTETRICS & GYNECOLOGY

## 2020-11-14 PROCEDURE — 99072 ADDL SUPL MATRL&STAF TM PHE: CPT | Performed by: OBSTETRICS & GYNECOLOGY

## 2020-11-14 PROCEDURE — 3008F BODY MASS INDEX DOCD: CPT | Performed by: OBSTETRICS & GYNECOLOGY

## 2020-11-14 PROCEDURE — 99396 PREV VISIT EST AGE 40-64: CPT | Performed by: OBSTETRICS & GYNECOLOGY

## 2020-11-14 PROCEDURE — 3079F DIAST BP 80-89 MM HG: CPT | Performed by: OBSTETRICS & GYNECOLOGY

## 2020-11-14 NOTE — PROGRESS NOTES
Rebekah Vincent is a 54year old female [id number] Patient's last menstrual period was 08/19/2019 (lmp unknown). Patient presents with:  Wellness Visit: pt wants to discuss OCPs and if she should continue. No period in the last year  .      Is worried about h uterine cyst removed       SOCIAL HISTORY:  Social History    Socioeconomic History      Marital status:        Spouse name: [place]      Number of children: 1      Years of education: Not on file      Highest education level: Not on file    Occupationa Occupational Exposure: Not Asked        Hobby Hazards: Not Asked        Sleep Concern: Not Asked        Back Care: Not Asked        Bike Helmet: Not Asked        Self-Exams: Not Asked    Social History Narrative      Not on file      FAMILY HISTORY:  Famil situation.  Appropriate mood and affect    Pelvic Exam:  External Genitalia: normal appearance, hair distribution, and no lesions  Urethral Meatus:  normal in size, location, without lesions and prolapse  Bladder:  No fullness, masses or tenderness  Vagina:

## 2020-12-17 ENCOUNTER — TELEPHONE (OUTPATIENT)
Dept: FAMILY MEDICINE CLINIC | Facility: CLINIC | Age: 55
End: 2020-12-17

## 2020-12-17 NOTE — TELEPHONE ENCOUNTER
----- Message from Sridhar Head sent at 12/17/2020  2:21 PM CST -----  Regarding: RE: Non-Urgent Medical Question  Contact: [phone]  Hi,  I am so sorry for the delay in responding but I appreciate the info and I have attached the pdf of my health

## 2021-01-05 ENCOUNTER — HOSPITAL ENCOUNTER (OUTPATIENT)
Dept: MAMMOGRAPHY | Facility: HOSPITAL | Age: 56
Discharge: HOME | End: 2021-01-05
Attending: OBSTETRICS & GYNECOLOGY
Payer: COMMERCIAL

## 2021-01-05 DIAGNOSIS — Z12.31 ENCOUNTER FOR SCREENING MAMMOGRAM FOR BREAST CANCER: ICD-10-CM

## 2021-01-05 PROCEDURE — 77067 SCR MAMMO BI INCL CAD: CPT | Performed by: OBSTETRICS & GYNECOLOGY

## 2021-01-05 PROCEDURE — 77063 BREAST TOMOSYNTHESIS BI: CPT | Performed by: OBSTETRICS & GYNECOLOGY

## 2021-01-11 ENCOUNTER — HOSPITAL ENCOUNTER (OUTPATIENT)
Dept: MAMMOGRAPHY | Facility: HOSPITAL | Age: 56
Discharge: HOME | End: 2021-01-11
Attending: OBSTETRICS & GYNECOLOGY
Payer: COMMERCIAL

## 2021-01-11 ENCOUNTER — TELEPHONE (OUTPATIENT)
Dept: OBGYN CLINIC | Facility: CLINIC | Age: 56
End: 2021-01-11

## 2021-01-11 DIAGNOSIS — R92.2 INCONCLUSIVE MAMMOGRAM: ICD-10-CM

## 2021-01-11 PROCEDURE — 77062 BREAST TOMOSYNTHESIS BI: CPT | Performed by: OBSTETRICS & GYNECOLOGY

## 2021-01-11 PROCEDURE — 76642 ULTRASOUND BREAST LIMITED: CPT | Performed by: OBSTETRICS & GYNECOLOGY

## 2021-01-11 PROCEDURE — 77066 DX MAMMO INCL CAD BI: CPT | Performed by: OBSTETRICS & GYNECOLOGY

## 2021-01-11 NOTE — TELEPHONE ENCOUNTER
Pt advised mammograms ordered based on radiology recommendations. Advised to contact insurance to verify what would be covered ie dense breast mamogram. Understanding verbalized.

## 2021-01-11 NOTE — TELEPHONE ENCOUNTER
Patient is calling to ask about her mammogram orders. Patient states that every year she has to get 2. They end up doing diagnostic after the regular one.  Patient states her insurance only covers one per year and she is not understanding why she is doing 2

## 2021-01-20 RX ORDER — NORETHINDRONE ACETATE AND ETHINYL ESTRADIOL, ETHINYL ESTRADIOL AND FERROUS FUMARATE 1MG-10(24)
KIT ORAL
Qty: 84 TABLET | Refills: 2 | Status: SHIPPED | OUTPATIENT
Start: 2021-01-20 | End: 2021-09-13

## 2021-05-02 ENCOUNTER — IMMUNIZATION (OUTPATIENT)
Dept: LAB | Age: 56
End: 2021-05-02
Attending: HOSPITALIST
Payer: COMMERCIAL

## 2021-05-02 DIAGNOSIS — Z23 NEED FOR VACCINATION: Primary | ICD-10-CM

## 2021-05-02 PROCEDURE — 0001A SARSCOV2 VAC 30MCG/0.3ML IM: CPT

## 2021-05-23 ENCOUNTER — IMMUNIZATION (OUTPATIENT)
Dept: LAB | Age: 56
End: 2021-05-23
Attending: HOSPITALIST
Payer: COMMERCIAL

## 2021-05-23 DIAGNOSIS — Z23 NEED FOR VACCINATION: Primary | ICD-10-CM

## 2021-05-23 PROCEDURE — 0002A SARSCOV2 VAC 30MCG/0.3ML IM: CPT

## 2021-09-13 RX ORDER — NORETHINDRONE ACETATE AND ETHINYL ESTRADIOL, ETHINYL ESTRADIOL AND FERROUS FUMARATE 1MG-10(24)
KIT ORAL
Qty: 84 TABLET | Refills: 0 | Status: SHIPPED | OUTPATIENT
Start: 2021-09-13 | End: 2021-12-09

## 2021-10-13 ENCOUNTER — HOSPITAL ENCOUNTER (OUTPATIENT)
Dept: CT IMAGING | Facility: HOSPITAL | Age: 56
Discharge: HOME | End: 2021-10-13
Attending: FAMILY MEDICINE

## 2021-10-13 DIAGNOSIS — Z13.6 SCREENING FOR CARDIOVASCULAR CONDITION: ICD-10-CM

## 2021-10-13 DIAGNOSIS — Z13.9 SCREENING PROCEDURE: ICD-10-CM

## 2021-12-03 RX ORDER — POLYMYXIN B SULFATE AND TRIMETHOPRIM 1; 10000 MG/ML; [USP'U]/ML
SOLUTION OPHTHALMIC
COMMUNITY
Start: 2021-10-12 | End: 2021-12-06

## 2021-12-06 ENCOUNTER — OFFICE VISIT (OUTPATIENT)
Dept: FAMILY MEDICINE CLINIC | Facility: CLINIC | Age: 56
End: 2021-12-06
Payer: COMMERCIAL

## 2021-12-06 VITALS
DIASTOLIC BLOOD PRESSURE: 100 MMHG | OXYGEN SATURATION: 100 % | WEIGHT: 123.81 LBS | HEART RATE: 83 BPM | HEIGHT: 65 IN | TEMPERATURE: 98 F | SYSTOLIC BLOOD PRESSURE: 146 MMHG | BODY MASS INDEX: 20.63 KG/M2 | RESPIRATION RATE: 18 BRPM

## 2021-12-06 DIAGNOSIS — R31.29 MICROSCOPIC HEMATURIA: ICD-10-CM

## 2021-12-06 DIAGNOSIS — B07.8 OTHER VIRAL WARTS: ICD-10-CM

## 2021-12-06 DIAGNOSIS — Z12.31 ENCOUNTER FOR SCREENING MAMMOGRAM FOR MALIGNANT NEOPLASM OF BREAST: ICD-10-CM

## 2021-12-06 DIAGNOSIS — Z78.0 ASYMPTOMATIC MENOPAUSE: ICD-10-CM

## 2021-12-06 DIAGNOSIS — E83.39 LOW PHOSPHATE LEVELS: ICD-10-CM

## 2021-12-06 DIAGNOSIS — R03.0 ELEVATED BLOOD PRESSURE READING: ICD-10-CM

## 2021-12-06 DIAGNOSIS — R92.2 DENSE BREAST TISSUE ON MAMMOGRAM: ICD-10-CM

## 2021-12-06 DIAGNOSIS — Z00.00 ANNUAL PHYSICAL EXAM: Primary | ICD-10-CM

## 2021-12-06 PROBLEM — M77.8 LEFT ELBOW TENDONITIS: Status: RESOLVED | Noted: 2020-07-23 | Resolved: 2021-12-06

## 2021-12-06 PROBLEM — R92.30 DENSE BREAST TISSUE ON MAMMOGRAM: Status: ACTIVE | Noted: 2021-12-06

## 2021-12-06 PROBLEM — Z01.419 WELL WOMAN EXAM WITH ROUTINE GYNECOLOGICAL EXAM: Status: RESOLVED | Noted: 2018-10-23 | Resolved: 2021-12-06

## 2021-12-06 PROCEDURE — 3077F SYST BP >= 140 MM HG: CPT | Performed by: FAMILY MEDICINE

## 2021-12-06 PROCEDURE — 81003 URINALYSIS AUTO W/O SCOPE: CPT | Performed by: FAMILY MEDICINE

## 2021-12-06 PROCEDURE — 3008F BODY MASS INDEX DOCD: CPT | Performed by: FAMILY MEDICINE

## 2021-12-06 PROCEDURE — 3080F DIAST BP >= 90 MM HG: CPT | Performed by: FAMILY MEDICINE

## 2021-12-06 PROCEDURE — 99396 PREV VISIT EST AGE 40-64: CPT | Performed by: FAMILY MEDICINE

## 2021-12-06 NOTE — PROGRESS NOTES
REASON FOR VISIT:    Letitia Lopez is a 64year old female who presents for an [address]. Complains of right hand persistent wart middle finger volar surface had treated previously with cryotherapy.     Performed health screening labs 1 Pressure Mother    • Heart Disease Paternal Grandfather    • No Known Problems Son    • Other (Other) Maternal Grandmother         osteoporosis         Patient Active Problem List:     UTI (urinary tract infection)     Cutaneous wart     Well woman exam wi Social Interaction    How would you describe your daily physical activity?: Moderate    If you are a male age 38-65 or a female age 47-67, do you take aspirin?: No    Have you had any immunizations at another office such as Influenza, Hepatitis B, Tetanus, with age, risk and gender LDL Cholesterol (mg/dL)   Date Value   11/06/2020 101       Diabetes Screening  if history of high blood pressure or other  risk factors No results found for: A1C  Glucose (mg/dL)   Date Value   07/07/2017 80         Gonorrhea Scr DAY IN THE EVENING 30 capsule 1   • Multiple Vitamin (MULTI-VITAMIN DAILY) Oral Tab Take 1 tablet by mouth daily. • Acidophilus/Pectin Oral Cap Take 1 capsule by mouth daily.      • Calcium Carbonate Antacid 1000 MG Oral Chew Tab Chew 1,000 mg by mouth or less    Drug use: No    Occ:    :  not dating       REVIEW OF SYSTEMS:   GENERAL: feels well otherwise  SKIN: denies any unusual skin lesions  EYES: denies blurred vision or double vision  HEENT: denies nasal congestion, sinus pain or ST care facility  Healthy-weight normal BMI  -Encourage healthy diet of whole food and avoid processed food and sugary drinks and sodas. Diet should include lean meats and vegetables including 5-7 servings of fruit and vegetables total in 1 day.   Never skip having random pains in her back worried she has kidney cancer   will follow-up with urology-no family history              The patient indicates understanding of these issues and agrees to the plan.   Return in about 1 month (around 1/6/2022) for wart appt

## 2021-12-06 NOTE — PATIENT INSTRUCTIONS
-Encourage healthy diet of whole food and avoid processed food and sugary drinks and sodas. Diet should include lean meats and vegetables including 5-7 servings of fruit and vegetables total in 1 day.   Never skip breakfast.  -Encouraged exercise 30 minute blood cholesterol level to help prevent further heart trouble  · Lower your blood pressure to help prevent a stroke or heart attack  · Control diabetes, or reduce your risk of getting this disease  · Improve your heart and lung function  · Reach and mainta Foods  Eating has a big impact on your heart health. In fact, eating healthier can improve several of your heart risks at once. For instance, it helps you manage weight, cholesterol, and blood pressure.  Here are ideas to help you make heart-healthy changes diet. If you have diabetes, you may have different recommendations than what is listed here:  · Fruits and vegetables provide plenty of nutrients without a lot of calories. At meals, fill half your plate with these foods.  Split the other half of your plate dressings, and vinegar. For salt-free herbs and spices, try basil, cilantro, cinnamon, pepper, and rosemary. Date Last Reviewed: 10/1/2017  © 6919-9746 [employer]. [facility], [address] [place] [address]. All rights reserved.  This informa increasing your calcium intake. Calcium supplements and other osteoporosis treatments do have risks. So talk with your healthcare provider if you have concerns. If you have osteoporosis, you can also learn ways to increase everyday safety.   Date Last Revie years old: 1,000 mg  46to 79years old, women: 1,200 mg  46to 79years old, men: 1,000 mg  Pregnant or nursing: 15to 25years old: 1,300 mg, 23to 48years old: 1,000 mg  Older than 79 (women and men): 1,200 mg   Date Last Reviewed: 5/1/2018  © 2000-201 your healthcare provider diagnose or treat:  · Problems with the parathyroid gland  · Cancer  · Autoimmune diseases, such as multiple sclerosis and Crohn's disease  · Psoriasis  · Asthma  · Weakness or falls    What other tests might I have along with this take could affect your results. How do I get ready for this test?  Tell your healthcare provider if you take vitamin D supplements. Be sure your healthcare provider knows about all medicines, herbs, vitamins, and supplements you are taking.  This includes professional medical care. Always follow your healthcare professional's instructions. Living with Osteoporosis: Regular Exercise  If you have osteoporosis, exercise is vital for your health. It can prevent bone fractures and spine changes.  It will

## 2021-12-07 ENCOUNTER — PATIENT MESSAGE (OUTPATIENT)
Dept: FAMILY MEDICINE CLINIC | Facility: CLINIC | Age: 56
End: 2021-12-07

## 2021-12-07 DIAGNOSIS — R31.29 MICROSCOPIC HEMATURIA: Primary | ICD-10-CM

## 2021-12-07 NOTE — TELEPHONE ENCOUNTER
From: Fco Head  To:  Chito Mays DO  Sent: 12/7/2021 5:05 PM CST  Subject: Urology appt    Hi there, I made the appointment with a urologist however it’s not until February 23. I’m just checking in to make sure that that’s not too far out nothing

## 2021-12-08 NOTE — TELEPHONE ENCOUNTER
There is only trace blood in the urinalysis. It is okay to wait until February or patient may be seen by Andrea Saucedo at [place] [id number] if patient desires. Referral placed.   Please inform

## 2021-12-09 ENCOUNTER — HOSPITAL ENCOUNTER (OUTPATIENT)
Dept: BONE DENSITY | Age: 56
Discharge: HOME | End: 2021-12-09
Attending: FAMILY MEDICINE
Payer: COMMERCIAL

## 2021-12-09 DIAGNOSIS — Z78.0 ASYMPTOMATIC MENOPAUSE: ICD-10-CM

## 2021-12-09 PROCEDURE — 77080 DXA BONE DENSITY AXIAL: CPT | Performed by: FAMILY MEDICINE

## 2021-12-09 RX ORDER — NORETHINDRONE ACETATE AND ETHINYL ESTRADIOL, ETHINYL ESTRADIOL AND FERROUS FUMARATE 1MG-10(24)
KIT ORAL
Qty: 84 TABLET | Refills: 0 | Status: SHIPPED | OUTPATIENT
Start: 2021-12-09 | End: 2022-01-17

## 2021-12-17 ENCOUNTER — MED REC SCAN ONLY (OUTPATIENT)
Dept: FAMILY MEDICINE CLINIC | Facility: CLINIC | Age: 56
End: 2021-12-17

## 2021-12-21 ENCOUNTER — OFFICE VISIT (OUTPATIENT)
Dept: OBGYN CLINIC | Facility: CLINIC | Age: 56
End: 2021-12-21
Payer: COMMERCIAL

## 2021-12-21 VITALS
DIASTOLIC BLOOD PRESSURE: 86 MMHG | HEART RATE: 91 BPM | WEIGHT: 121.19 LBS | SYSTOLIC BLOOD PRESSURE: 136 MMHG | HEIGHT: 65 IN | BODY MASS INDEX: 20.19 KG/M2

## 2021-12-21 DIAGNOSIS — Z12.31 ENCOUNTER FOR MAMMOGRAM TO ESTABLISH BASELINE MAMMOGRAM: ICD-10-CM

## 2021-12-21 DIAGNOSIS — Z01.419 WELL WOMAN EXAM WITH ROUTINE GYNECOLOGICAL EXAM: ICD-10-CM

## 2021-12-21 DIAGNOSIS — Z12.31 ENCOUNTER FOR SCREENING MAMMOGRAM FOR BREAST CANCER: Primary | ICD-10-CM

## 2021-12-21 PROCEDURE — 3075F SYST BP GE 130 - 139MM HG: CPT | Performed by: OBSTETRICS & GYNECOLOGY

## 2021-12-21 PROCEDURE — 99396 PREV VISIT EST AGE 40-64: CPT | Performed by: OBSTETRICS & GYNECOLOGY

## 2021-12-21 PROCEDURE — 3079F DIAST BP 80-89 MM HG: CPT | Performed by: OBSTETRICS & GYNECOLOGY

## 2021-12-21 PROCEDURE — 3008F BODY MASS INDEX DOCD: CPT | Performed by: OBSTETRICS & GYNECOLOGY

## 2021-12-22 NOTE — PROGRESS NOTES
Jocelyn Cardoso is a 64year old female [id number] Patient's last menstrual period was 08/19/2019 (lmp unknown). Patient presents with:  Wellness Visit  . No vaginal bleeding. No hot flashes night sweats vaginal dryness.   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 , Disp: , Rfl:   •  Calcium Carbonate Antacid 1000 MG Oral Chew Tab, Chew 1,000 mg by mouth daily. , Disp: , Rfl:   •  LO LOESTRIN FE 1 MG-10 MCG / 10 MCG Oral Tab, TAKE 1 TABLET BY MOUTH EVERY DAY (Patient not taking: Reported on 12/

## 2022-01-11 ENCOUNTER — HOSPITAL ENCOUNTER (OUTPATIENT)
Dept: MAMMOGRAPHY | Facility: HOSPITAL | Age: 57
Discharge: HOME | End: 2022-01-11
Attending: OBSTETRICS & GYNECOLOGY
Payer: COMMERCIAL

## 2022-01-11 DIAGNOSIS — Z12.31 ENCOUNTER FOR SCREENING MAMMOGRAM FOR BREAST CANCER: ICD-10-CM

## 2022-01-11 PROCEDURE — 77063 BREAST TOMOSYNTHESIS BI: CPT | Performed by: OBSTETRICS & GYNECOLOGY

## 2022-01-11 PROCEDURE — 77067 SCR MAMMO BI INCL CAD: CPT | Performed by: OBSTETRICS & GYNECOLOGY

## 2022-01-17 RX ORDER — NITROFURANTOIN MACROCRYSTALS 50 MG/1
CAPSULE ORAL
COMMUNITY
Start: 2021-12-07

## 2022-01-17 RX ORDER — METRONIDAZOLE 500 MG/1
TABLET ORAL
COMMUNITY
Start: 2022-01-07 | End: 2022-01-18

## 2022-01-18 ENCOUNTER — OFFICE VISIT (OUTPATIENT)
Dept: FAMILY MEDICINE CLINIC | Facility: CLINIC | Age: 57
End: 2022-01-18
Payer: COMMERCIAL

## 2022-01-18 VITALS
OXYGEN SATURATION: 99 % | TEMPERATURE: 97 F | HEART RATE: 92 BPM | WEIGHT: 122.38 LBS | HEIGHT: 65 IN | RESPIRATION RATE: 16 BRPM | BODY MASS INDEX: 20.39 KG/M2 | SYSTOLIC BLOOD PRESSURE: 136 MMHG | DIASTOLIC BLOOD PRESSURE: 80 MMHG

## 2022-01-18 DIAGNOSIS — N95.1 HOT FLASHES DUE TO MENOPAUSE: ICD-10-CM

## 2022-01-18 DIAGNOSIS — B07.9 VERRUCA VULGARIS: ICD-10-CM

## 2022-01-18 DIAGNOSIS — R03.0 ELEVATED BLOOD PRESSURE READING IN OFFICE WITHOUT DIAGNOSIS OF HYPERTENSION: Primary | ICD-10-CM

## 2022-01-18 PROCEDURE — 99214 OFFICE O/P EST MOD 30 MIN: CPT | Performed by: FAMILY MEDICINE

## 2022-01-18 PROCEDURE — 3079F DIAST BP 80-89 MM HG: CPT | Performed by: FAMILY MEDICINE

## 2022-01-18 PROCEDURE — 17110 DESTRUCTION B9 LES UP TO 14: CPT | Performed by: FAMILY MEDICINE

## 2022-01-18 PROCEDURE — 3075F SYST BP GE 130 - 139MM HG: CPT | Performed by: FAMILY MEDICINE

## 2022-01-18 PROCEDURE — 3008F BODY MASS INDEX DOCD: CPT | Performed by: FAMILY MEDICINE

## 2022-01-18 RX ORDER — GABAPENTIN 100 MG/1
100 CAPSULE ORAL NIGHTLY
Qty: 90 CAPSULE | Refills: 0 | Status: SHIPPED | OUTPATIENT
Start: 2022-01-18

## 2022-01-18 NOTE — PROGRESS NOTES
CHIEF COMPLAINT: Patient presents with: Follow - Up: wart on right middle finger, blood pressure reading       HPI:     Brian Meneses is a 64year old female presents for desires cryotherapy on right middle finger-warts.  Treated other warts on right ha had glasses since 5yrs old      Past Surgical History:   Procedure Laterality Date   • APPENDECTOMY  2004   • APPENDECTOMY  Jan 2004    [place] Clarity removed during a cyst removal surgery   • COLPOSCOPY, CERVIX W/UPPER ADJACENT VAGINA; W/BIOPSY(S), CERVI distress  EYES; PERRLA, EOM-i B/L.  Sclera clear and non icteric bilateral  SKIN: no rashes,no suspicious lesions, left middle finger volar surface middle phalanx-6cm oval verruca vulgaris  HEENT: atraumatic, normocephalic, wearing mask  NECK: supple,no janeth next 24 hours  If wart not resolved in the next 4 to 6 weeks then will retreat with cryotherapy    3. Hot flashes due to menopause  - gabapentin 100 MG Oral Cap; Take 1 capsule (100 mg total) by mouth nightly. Dispense: 90 capsule;  Refill: 0  Risk, benefi routine gynecological exam     Elevated blood pressure reading in office without diagnosis of hypertension     BMI 20.0-20.9, adult     Low phosphate levels     Dense breast tissue on mammogram      Imaging & Referrals:  [facility]FLUENZA REFUSED [facility]     1/18/2022

## 2022-02-22 ENCOUNTER — HOSPITAL ENCOUNTER (OUTPATIENT)
Dept: MAMMOGRAPHY | Facility: HOSPITAL | Age: 57
Discharge: HOME | End: 2022-02-22
Attending: OBSTETRICS & GYNECOLOGY
Payer: COMMERCIAL

## 2022-02-22 DIAGNOSIS — R92.2 INCONCLUSIVE MAMMOGRAM: ICD-10-CM

## 2022-02-22 PROCEDURE — 77061 BREAST TOMOSYNTHESIS UNI: CPT | Performed by: OBSTETRICS & GYNECOLOGY

## 2022-02-22 PROCEDURE — 77065 DX MAMMO INCL CAD UNI: CPT | Performed by: OBSTETRICS & GYNECOLOGY

## 2022-02-22 PROCEDURE — 76642 ULTRASOUND BREAST LIMITED: CPT | Performed by: OBSTETRICS & GYNECOLOGY

## 2022-02-28 RX ORDER — NORETHINDRONE ACETATE AND ETHINYL ESTRADIOL, ETHINYL ESTRADIOL AND FERROUS FUMARATE 1MG-10(24)
KIT ORAL
Qty: 84 TABLET | Refills: 0 | Status: SHIPPED | OUTPATIENT
Start: 2022-02-28 | End: 2022-05-09

## 2022-04-19 ENCOUNTER — PATIENT MESSAGE (OUTPATIENT)
Dept: OBGYN CLINIC | Facility: CLINIC | Age: 57
End: 2022-04-19

## 2022-04-19 NOTE — TELEPHONE ENCOUNTER
From: Ant Mancini  To: Mar Villavicencio MD  Sent: 4/19/2022 2:57 PM CDT  Subject: Question on followup Mammogram    Good afternoon, as a follow up from my 2nd Mammogram in February, it was suggested to wait 6 months to review and possible biopsy. I have potential travel plans during that time so i wanted some further information on what I would need to do leading up to a biopsy. Would I need to have another Mammogram?  If so, at that time would the biopsy then need to be done at that time or another  appt need to be scheduled for the biopsy? I'm trying to plan everything I have going on accordingly and if there's potential down time, trying to work that into my schedule. Please let me know the process and if there are any work orders that need to be created for all of this. Thanks much.

## 2022-05-09 ENCOUNTER — TELEPHONE (OUTPATIENT)
Dept: OBGYN CLINIC | Facility: CLINIC | Age: 57
End: 2022-05-09

## 2022-05-09 RX ORDER — NORETHINDRONE ACETATE AND ETHINYL ESTRADIOL, ETHINYL ESTRADIOL AND FERROUS FUMARATE 1MG-10(24)
KIT ORAL
Qty: 84 TABLET | Refills: 1 | Status: SHIPPED | OUTPATIENT
Start: 2022-05-09

## 2022-05-09 RX ORDER — NITROFURANTOIN MACROCRYSTALS 50 MG/1
CAPSULE ORAL
Qty: 30 CAPSULE | Refills: 1 | Status: SHIPPED | OUTPATIENT
Start: 2022-05-09

## 2022-06-03 ENCOUNTER — HOSPITAL ENCOUNTER (OUTPATIENT)
Dept: MAMMOGRAPHY | Facility: HOSPITAL | Age: 57
Discharge: HOME | End: 2022-06-03
Attending: OBSTETRICS & GYNECOLOGY
Payer: COMMERCIAL

## 2022-06-03 DIAGNOSIS — R92.1 BREAST CALCIFICATIONS: ICD-10-CM

## 2022-06-03 PROCEDURE — 77065 DX MAMMO INCL CAD UNI: CPT | Performed by: OBSTETRICS & GYNECOLOGY

## 2022-06-03 PROCEDURE — 77061 BREAST TOMOSYNTHESIS UNI: CPT | Performed by: OBSTETRICS & GYNECOLOGY

## 2022-06-03 NOTE — IMAGING NOTE
This Breast Care RN assisted Dr. Governor Griffin with recommendation for a right breast 2 site stereotactic biopsy for calcifications. Procedure reviewed and all questions answered. Emotional and educational support given. On the day of the biopsy, pt instructed to take Tylenol 1000mg PO, eat a light meal & bring or wear a sports bra. Post biopsy care also reviewed with pt to include NO lifting more than 5lbs, no exercising or housework (limit upper body movement) for 24-48 hrs post biopsy. Patient denies blood thinners, bleeding disorders, and  liver disease. Pt verbalized understanding. Our breast center schedulers will be calling to schedule an appt that is convenient for pt.

## 2022-06-27 ENCOUNTER — OFFICE VISIT (OUTPATIENT)
Dept: OBGYN CLINIC | Facility: CLINIC | Age: 57
End: 2022-06-27
Payer: COMMERCIAL

## 2022-06-27 VITALS
SYSTOLIC BLOOD PRESSURE: 120 MMHG | WEIGHT: 119 LBS | DIASTOLIC BLOOD PRESSURE: 60 MMHG | HEART RATE: 100 BPM | HEIGHT: 65 IN | BODY MASS INDEX: 19.83 KG/M2

## 2022-06-27 DIAGNOSIS — Z12.4 PAPANICOLAOU SMEAR FOR CERVICAL CANCER SCREENING: Primary | ICD-10-CM

## 2022-06-27 DIAGNOSIS — R10.2 PELVIC PAIN IN FEMALE: ICD-10-CM

## 2022-07-08 LAB — LAST PAP RESULT: NORMAL

## 2022-07-12 ENCOUNTER — HOSPITAL ENCOUNTER (OUTPATIENT)
Dept: MAMMOGRAPHY | Facility: HOSPITAL | Age: 57
Discharge: HOME | End: 2022-07-12
Attending: OBSTETRICS & GYNECOLOGY
Payer: COMMERCIAL

## 2022-07-12 DIAGNOSIS — R92.1 BREAST CALCIFICATIONS: ICD-10-CM

## 2022-07-12 PROCEDURE — 88360 TUMOR IMMUNOHISTOCHEM/MANUAL: CPT | Performed by: OBSTETRICS & GYNECOLOGY

## 2022-07-12 PROCEDURE — 19081 BX BREAST 1ST LESION STRTCTC: CPT | Performed by: OBSTETRICS & GYNECOLOGY

## 2022-07-12 PROCEDURE — 19082 BX BREAST ADD LESION STRTCTC: CPT | Performed by: OBSTETRICS & GYNECOLOGY

## 2022-07-12 PROCEDURE — 88342 IMHCHEM/IMCYTCHM 1ST ANTB: CPT | Performed by: OBSTETRICS & GYNECOLOGY

## 2022-07-12 PROCEDURE — 88305 TISSUE EXAM BY PATHOLOGIST: CPT | Performed by: OBSTETRICS & GYNECOLOGY

## 2022-07-12 PROCEDURE — 88341 IMHCHEM/IMCYTCHM EA ADD ANTB: CPT | Performed by: OBSTETRICS & GYNECOLOGY

## 2022-07-14 ENCOUNTER — TELEPHONE (OUTPATIENT)
Dept: MAMMOGRAPHY | Facility: HOSPITAL | Age: 57
End: 2022-07-14

## 2022-07-14 NOTE — TELEPHONE ENCOUNTER
Telephoned Rose Head and name,  verified with patient. Notified Rose Head of right breast 1 site positive for DCIS and right breast 2nd site positive for LCIS and ALH biopsy result. Concordance pending. Rose Head reports biopsy site is healing well. Radiologist recommends surgical consultation. Dr Shivani Hercules is referring patient to Dr Tutu Mason. Patient was given an appointment with Dr Tutu Mason on 22 at 10:00. Patient was provided with the contact information for Dr Tutu Mason, the Breast Program Navigators, and myself. Pt verbalized understanding and had no further questions at this time.

## 2022-07-14 NOTE — PROGRESS NOTES
Established Patient Note   HPI:   Patient was presented for a telehealth consultation via secure and encrypted videoconferencing technology. Kanu visit today is for annual medicare wellness. He has had recent problems with trigger fingers. He states he has been doing well.    Past Medical History:   Diagnosis Date   • Agatston CAC score, >400 11/20/2019    CAC Nov. 2019: LMA 29.3, .3, .3, .3 (total 1747.1)   • Arrhythmia     PAC   • Cancer (HCC) 2010    Prostate CA.   • Chronic bilateral low back pain without sciatica 4/3/2019   • Coronary artery disease 1/8/2020   • Diverticulosis of colon 10/1/2019   • Gastroesophageal reflux 4/3/2019   • Heart burn    • High cholesterol    • History of hay fever 4/3/2019   • History of prostate cancer 4/3/2019    Diagnosed 2010: Radical prostatectomy   • Hypercholesterolemia 4/3/2019   • Hypertension 4/3/2019   • Osteopenia of multiple sites 4/3/2019   • PAC (premature atrial contraction) 4/3/2019       Current Outpatient Medications   Medication Sig Dispense Refill   • zolpidem (AMBIEN) 10 MG Tab Take 0.5 Tabs by mouth at bedtime as needed for Sleep for up to 90 days. 45 Tab 1   • budesonide (PULMICORT) 90 MCG/ACT AEROSOL POWDER, BREATH ACTIVATED Inhale 2 Puffs by mouth 2 Times a Day. 1 Each 3   • ondansetron (ZOFRAN) 4 MG Tab tablet Take 1 Tab by mouth every 6 hours as needed for Nausea/Vomiting. 30 Tab 1   • simvastatin (ZOCOR) 80 MG tablet Take 1 Tab by mouth every evening. 90 Tab 3   • nitroglycerin (NITROSTAT) 0.4 MG SL Tab Take 1 tablet SL prn chest pain; may repeat every 5 minutes; maximum 3 tabs/15 minutes.  If no relief seek immediate medical attention. (Patient taking differently: as needed. Take 1 tablet SL prn chest pain; may repeat every 5 minutes; maximum 3 tabs/15 minutes.  If no relief seek immediate medical attention.) 25 Tab 1   • mag-trisilicate/al-hydrox (GAVISCON) 80-20 MG Chew Tab Take 1 Tab by mouth 2 times a day as needed.     •  Spoke with patient about pathology results and recommendations to see DR ANNALISA Hess. She has an appointment scheduled already. Patient verbalized understanding. famotidine (PEPCID) 20 MG Tab Take 20 mg by mouth every day.     • Multiple Vitamins-Minerals (MENS 50+ MULTI VITAMIN/MIN) Tab Take 1 tablet by mouth every day. 100 Tab 3   • triamterene/hctz (MAXZIDE-25/DYAZIDE) 37.5-25 MG Cap Take 1 Cap by mouth every morning.     • fluticasone (FLONASE) 50 MCG/ACT nasal spray Spray 1 Spray in nose every day.     • sildenafil citrate (VIAGRA) 100 MG tablet Take 50 mg by mouth as needed for Erectile Dysfunction.     • denosumab (PROLIA) 60 MG/ML Solution Prefilled Syringe Inject 60 mg as instructed every 6 months. Indications: Osteoporosis       No current facility-administered medications for this visit.          No Known Allergies      Social History     Tobacco Use   • Smoking status: Never Smoker   • Smokeless tobacco: Never Used   Substance Use Topics   • Alcohol use: Yes     Comment: 3 per week   • Drug use: No       Past Surgical History:   Procedure Laterality Date   • PB KNEE SCOPE,DIAGNOSTIC Right 2/10/2020    Procedure: ARTHROSCOPY, KNEE - W/DEBRIDEMENT, LATERAL MENISECTOMY;  Surgeon: Denis Wiseman M.D.;  Location: SURGERY [facility];  Service: Orthopedics   • PROSTATECTOMY, RADIAL  2010   • PROSTATECTOMY, RADIAL  2010   • ROTATOR CUFF REPAIR Bilateral     Left and right   • TONSILLECTOMY AND ADENOIDECTOMY      As a child        ROS  Depression Screening    Little interest or pleasure in doing things?  0 - not at all  Feeling down, depressed , or hopeless? 0 - not at all  Patient Health Questionnaire Score: 0     If depressive symptoms identified deferred to follow up visit unless specifically addressed in assessment and plan.    Interpretation of PHQ-9 Total Score   Score Severity   1-4 No Depression   5-9 Mild Depression   10-14 Moderate Depression   15-19 Moderately Severe Depression   20-27 Severe Depression    Screening for Cognitive Impairment    Three Minute Recall (river, nation, finger) 3/3    Froy clock face with all 12 numbers and set the hands to  "show 10 past 11.  Yes    Cognitive concerns identified deferred for follow up unless specifically addressed in assessment and plan.    Fall Risk Assessment    Has the patient had two or more falls in the last year or any fall with injury in the last year?  No    Safety Assessment    Throw rugs on floor.  No  Handrails on all stairs.  Yes  Good lighting in all hallways.  Yes  Difficulty hearing.  Yes  Patient counseled about all safety risks that were identified.    Functional Assessment ADLs    Are there any barriers preventing you from cooking for yourself or meeting nutritional needs?  No.    Are there any barriers preventing you from driving safely or obtaining transportation?  No.    Are there any barriers preventing you from using a telephone or calling for help?  No.    Are there any barriers preventing you from shopping?  No.    Are there any barriers preventing you from taking care of your own finances?  No.    Are there any barriers preventing you from managing your medications?  No.    Are there any barriers preventing you from showering, bathing or dressing yourself?  No.    Are you currently engaging in any exercise or physical activity?  Yes.     What is your perception of your health?  Good.      Health Maintenance Summary                Annual Wellness Visit Next Due 12/22/2021      Done 12/21/2020 Visit Dx: Medicare annual wellness visit, subsequent     Patient has more history with this topic...    COLONOSCOPY Next Due 11/15/2023      Done 11/15/2013     IMM DTaP/Tdap/Td Vaccine Next Due 5/7/2029      Done 5/7/2019 Imm Admin: Tdap Vaccine          Patient Care Team:  Valente Pérez M.D. as PCP - General (Internal Medicine)  Ambulatory Vitals  /80 Comment: all vital signs reported by patient at virtual visit  Pulse 72   Ht 1.803 m (5' 11\")   Wt 78.9 kg (174 lb)   SpO2 96%   BMI 24.27 kg/m²     Physical Exam    Component      Latest Ref Rng & Units 12/8/2020   WBC      4.8 - 10.8 K/uL 4.7 " (L)   RBC      4.70 - 6.10 M/uL 4.92   Hemoglobin      14.0 - 18.0 g/dL 15.7   Hematocrit      42.0 - 52.0 % 48.2   MCV      81.4 - 97.8 fL 98.0 (H)   MCH      27.0 - 33.0 pg 31.9   MCHC      33.7 - 35.3 g/dL 32.6 (L)   RDW      35.9 - 50.0 fL 51.8 (H)   Platelet Count      164 - 446 K/uL 170   MPV      9.0 - 12.9 fL 10.4   Neutrophils-Polys      44.00 - 72.00 % 54.50   Lymphocytes      22.00 - 41.00 % 32.60   Monocytes      0.00 - 13.40 % 10.60   Eosinophils      0.00 - 6.90 % 0.80   Basophils      0.00 - 1.80 % 1.30   Immature Granulocytes      0.00 - 0.90 % 0.20   Nucleated RBC      /100 WBC 0.00   Neutrophils (Absolute)      1.82 - 7.42 K/uL 2.57   Lymphs (Absolute)      1.00 - 4.80 K/uL 1.54   Monos (Absolute)      0.00 - 0.85 K/uL 0.50   Eos (Absolute)      0.00 - 0.51 K/uL 0.04   Baso (Absolute)      0.00 - 0.12 K/uL 0.06   Immature Granulocytes (abs)      0.00 - 0.11 K/uL 0.01   NRBC (Absolute)      K/uL 0.00   Sodium      135 - 145 mmol/L 136   Potassium      3.6 - 5.5 mmol/L 3.7   Chloride      96 - 112 mmol/L 99   Co2      20 - 33 mmol/L 29   Anion Gap      7.0 - 16.0 8.0   Glucose      65 - 99 mg/dL 92   Bun      8 - 22 mg/dL 16   Creatinine      0.50 - 1.40 mg/dL 1.08   Calcium      8.5 - 10.5 mg/dL 9.1   AST(SGOT)      12 - 45 U/L 27   ALT(SGPT)      2 - 50 U/L 31   Alkaline Phosphatase      30 - 99 U/L 60   Total Bilirubin      0.1 - 1.5 mg/dL 0.7   Albumin      3.2 - 4.9 g/dL 4.4   Total Protein      6.0 - 8.2 g/dL 7.1   Globulin      1.9 - 3.5 g/dL 2.7   A-G Ratio      g/dL 1.6   Cholesterol,Tot      <=199 mg/dL 140   Triglycerides      30 - 149 mg/dL 81   HDL      40 - 59 mg/dL 58   LDL Cholesterol      <=129 mg/dL 66   LDL Particle      <=1135 nmol/L 893   Small LDL      <=634 nmol/L 464   L-VLDL Particle No.      <=2.7 nmol/L <1.5   HDL Particle No.      >=33.0 umol/L 37.1   L-HDL Particle No.      >=4.2 umol/L 4.5   LDL Particle Size      >=20.7 nm 20.7   VLDL Size      <=46.7 nm 49.0 (H)   HDL  Size      >=8.9 nm 8.8 (L)   EER LipoFit by NMR       See Note   GFR If African American      >60 mL/min/1.73 m 2 >60   GFR If Non African American      >60 mL/min/1.73 m 2 >60   CPK Total      0 - 154 U/L 76   TSH      0.380 - 5.330 uIU/mL 5.150   Microsomal -Tpo- Abs      0.0 - 9.0 IU/mL <9.0   Sed Rate Westergren      0 - 20 mm/hour 3   Stat C-Reactive Protein      0.00 - 0.75 mg/dL 0.08   Ccp Antibodies      0 - 19 Units 8   Rheumatoid Factor -Neph-      0 - 14 IU/mL <10     Assessment and Plan:     1. Medicare annual wellness visit, subsequent     2. Agatston CAC score, >400  LipoFit by NMR   3. Hypercholesterolemia  LipoFit by NMR    TSH    TRIIDOTHYRONINE    FREE THYROXINE   4. Essential hypertension  Comp Metabolic Panel    TSH    TRIIDOTHYRONINE    FREE THYROXINE   5. History of prostate cancer  Comp Metabolic Panel    PROSTATE SPECIFIC AG DIAGNOSTIC   6. Long term use of drug  Comp Metabolic Panel   7. Abnormal thyroid blood test  TSH    TRIIDOTHYRONINE    FREE THYROXINE   8. Osteoporosis without current pathological fracture, unspecified osteoporosis type  VITAMIN D,25 HYDROXY     Kanu had questions regarding covid treatment and vaccine. Reviewed lab results. Check blood work in 6 months to include CMP, PSA, lipoprofile NMR, TSH/T3/freeT4.    Valente Pérez M.D.

## 2022-07-15 ENCOUNTER — NURSE NAVIGATOR ENCOUNTER (OUTPATIENT)
Dept: HEMATOLOGY/ONCOLOGY | Facility: HOSPITAL | Age: 57
End: 2022-07-15

## 2022-07-15 NOTE — PROGRESS NOTES
Phoned patient and we discussed newly diagnosed breast cancer. Introduced myself as one of the breast nurse navigators and explained the role of the breast nurse navigator. Explained the role of the physicians on her breast cancer care team. Reviewed over pathology report and discussed the type of breast cancer, grade, receptors are pending. Briefly discussed breast cancer treatments including surgery, radiation, hormone therapy, and chemotherapy. Explained the breast cancer multidisciplinary meeting and that her case will be discussed. Patient given my contact information to call with any further questions or concerns.

## 2022-07-16 ENCOUNTER — HOSPITAL ENCOUNTER (OUTPATIENT)
Dept: ULTRASOUND IMAGING | Age: 57
Discharge: HOME | End: 2022-07-16
Attending: OBSTETRICS & GYNECOLOGY
Payer: COMMERCIAL

## 2022-07-16 ENCOUNTER — PATIENT MESSAGE (OUTPATIENT)
Dept: OBGYN CLINIC | Facility: CLINIC | Age: 57
End: 2022-07-16

## 2022-07-16 DIAGNOSIS — R10.2 PELVIC PAIN IN FEMALE: ICD-10-CM

## 2022-07-16 PROCEDURE — 76830 TRANSVAGINAL US NON-OB: CPT | Performed by: OBSTETRICS & GYNECOLOGY

## 2022-07-16 PROCEDURE — 76856 US EXAM PELVIC COMPLETE: CPT | Performed by: OBSTETRICS & GYNECOLOGY

## 2022-07-18 NOTE — TELEPHONE ENCOUNTER
Spoke to patient, informed message from Dr. Dari Michelle was in relation to the procedure note and not the pathology results. Apologized for the misunderstanding to patient. Patient is requesting for Dr. Dari Michelle to review her US results that was send to her as normal and provide additional recommendation as needed. Message routed to Dr. Dari Michelle!

## 2022-07-19 ENCOUNTER — OFFICE VISIT (OUTPATIENT)
Facility: LOCATION | Age: 57
End: 2022-07-19
Payer: COMMERCIAL

## 2022-07-19 ENCOUNTER — NURSE NAVIGATOR ENCOUNTER (OUTPATIENT)
Dept: HEMATOLOGY/ONCOLOGY | Facility: HOSPITAL | Age: 57
End: 2022-07-19

## 2022-07-19 ENCOUNTER — OFFICE VISIT (OUTPATIENT)
Dept: HEMATOLOGY/ONCOLOGY | Facility: HOSPITAL | Age: 57
End: 2022-07-19
Attending: SURGERY
Payer: COMMERCIAL

## 2022-07-19 VITALS
SYSTOLIC BLOOD PRESSURE: 172 MMHG | RESPIRATION RATE: 18 BRPM | TEMPERATURE: 98 F | WEIGHT: 117.38 LBS | DIASTOLIC BLOOD PRESSURE: 98 MMHG | HEART RATE: 92 BPM | BODY MASS INDEX: 20 KG/M2 | OXYGEN SATURATION: 98 %

## 2022-07-19 DIAGNOSIS — D05.11 DUCTAL CARCINOMA IN SITU (DCIS) OF RIGHT BREAST: Primary | ICD-10-CM

## 2022-07-19 DIAGNOSIS — D05.01 LOBULAR CARCINOMA IN SITU (LCIS) OF RIGHT BREAST: ICD-10-CM

## 2022-07-19 PROCEDURE — 99244 OFF/OP CNSLTJ NEW/EST MOD 40: CPT | Performed by: SURGERY

## 2022-07-19 NOTE — PROGRESS NOTES
Called patient to notify her of her scheduled breast MRI for July 29, 2022 at 1700. Instructed patient to arrive at 36 at the [facility] outpatient desk. Patient thanked me for the phone call and assistance. Pt was provided with the breast nurse navigators contact information and was encouraged to phone with any other questions or concerns.

## 2022-07-19 NOTE — TELEPHONE ENCOUNTER
Per Dr. Caty Adair recommendation patient informed of the following:  -  Basil Arreguin MD  P Emg Ob [facility] Clinical Staff  Stop ocps   - Discus Hereditary Cancer screening with Dr. Isabel Floyd  - Reassured pelvic US results are consistent with small fibroid  Patient verbalizes understanding.

## 2022-07-19 NOTE — PROGRESS NOTES
Patient presents to clinic for breast consultation. Right breast biopsy performed on 7/12/22. Patient is here to discuss pathology results and surgical options. No pain or discomfort at biopsy site. Son Hershell Beards present at visit. Denies any family history of breast cancer. Patient's blood pressure elevated in office but is nervous regarding today's visit. Blood pressure usually within normal limits. Medication and allergies reviewed and updated.

## 2022-07-19 NOTE — PROGRESS NOTES
Met with patient in Dr. [employer]. Introduced myself as one the breast nurse navigators and explained the role of the breast nurse navigator and coordination of care. Explained the role of all of the physicians involved in her care including the surgeon, medical oncologist, radiation oncologist, and possibly plastic surgeon. Reviewed over the patients pathology report and discussed receptors including ER/OR. Will contact patient with these results if they are not available. Patient was given the breast cancer treatment handbook and reviewed over how to use this resource. Discussed the breast multidisciplinary conference and that her case will be discussed. Patient was given the contact information for the social workers at the [facility] and resources for support as requested. Next step in care will be to schedule breast MRI. Pt was provided with breast nurse navigator contact information and was encouraged to phone with any other questions or concerns.

## 2022-07-29 ENCOUNTER — HOSPITAL ENCOUNTER (OUTPATIENT)
Dept: MRI IMAGING | Facility: HOSPITAL | Age: 57
Discharge: HOME | End: 2022-07-29
Attending: SURGERY
Payer: COMMERCIAL

## 2022-07-29 DIAGNOSIS — D05.01 LOBULAR CARCINOMA IN SITU (LCIS) OF RIGHT BREAST: ICD-10-CM

## 2022-07-29 DIAGNOSIS — D05.11 DUCTAL CARCINOMA IN SITU (DCIS) OF RIGHT BREAST: ICD-10-CM

## 2022-07-29 PROCEDURE — 77049 MRI BREAST C-+ W/CAD BI: CPT | Performed by: SURGERY

## 2022-07-29 PROCEDURE — A9575 INJ GADOTERATE MEGLUMI 0.1ML: HCPCS | Performed by: SURGERY

## 2022-08-02 ENCOUNTER — TELEPHONE (OUTPATIENT)
Facility: LOCATION | Age: 57
End: 2022-08-02

## 2022-08-02 DIAGNOSIS — D05.12 DUCTAL CARCINOMA IN SITU (DCIS) OF LEFT BREAST: Primary | ICD-10-CM

## 2022-08-02 DIAGNOSIS — D05.02 NEOPLASM OF LEFT BREAST, PRIMARY TUMOR STAGING CATEGORY TIS: LOBULAR CARCINOMA IN SITU (LCIS): ICD-10-CM

## 2022-08-02 NOTE — TELEPHONE ENCOUNTER
I called the patient to discuss her breast MRI results. Only 2 sites are enhancing, and each of the sites is quite small. I do feel her disease would be amenable to a 2 site lumpectomy. We discussed mastectomy versus lumpectomy, and the patient wishes to proceed with breast conservation. We reviewed the operative day as well as postoperative expectations, including radiation therapy. I will have my surgery schedulers call her to pick a date for surgery.     Jinger Libman, MD

## 2022-08-04 ENCOUNTER — TELEPHONE (OUTPATIENT)
Facility: LOCATION | Age: 57
End: 2022-08-04

## 2022-08-15 ENCOUNTER — TELEPHONE (OUTPATIENT)
Dept: MAMMOGRAPHY | Facility: HOSPITAL | Age: 57
End: 2022-08-15

## 2022-08-16 ENCOUNTER — TELEPHONE (OUTPATIENT)
Dept: MAMMOGRAPHY | Facility: HOSPITAL | Age: 57
End: 2022-08-16

## 2022-08-16 NOTE — TELEPHONE ENCOUNTER
Phoned [facility] regarding needle localization process of breast for lumpectomy scheduled for 8-25-22 with Dr. Patti Zuniga. Procedure explained and all questions answered. Pt to be transported via W/C through [facility] to [facility] in [place]. Pt verbalized understanding and had no further questions at this time.

## 2022-08-23 ENCOUNTER — LAB ENCOUNTER (OUTPATIENT)
Dept: LAB | Age: 57
End: 2022-08-23
Attending: SURGERY
Payer: COMMERCIAL

## 2022-08-23 DIAGNOSIS — Z01.812 ENCOUNTER FOR PREOPERATIVE SCREENING LABORATORY TESTING FOR COVID-19 VIRUS: ICD-10-CM

## 2022-08-23 DIAGNOSIS — Z20.822 ENCOUNTER FOR PREOPERATIVE SCREENING LABORATORY TESTING FOR COVID-19 VIRUS: ICD-10-CM

## 2022-08-24 ENCOUNTER — NURSE NAVIGATOR ENCOUNTER (OUTPATIENT)
Dept: HEMATOLOGY/ONCOLOGY | Facility: HOSPITAL | Age: 57
End: 2022-08-24

## 2022-08-24 LAB — SARS-COV-2 RNA RESP QL NAA+PROBE: NOT DETECTED

## 2022-08-24 NOTE — PROGRESS NOTES
Received phone call from patient who had questions about lumpectomy scheduled tomorrow. Let her know that pre admission testing would be calling today to confirm. Also provided phone number to MELE

## 2022-08-25 ENCOUNTER — ANESTHESIA (OUTPATIENT)
Dept: SURGERY | Facility: HOSPITAL | Age: 57
End: 2022-08-25
Payer: COMMERCIAL

## 2022-08-25 ENCOUNTER — ANESTHESIA EVENT (OUTPATIENT)
Dept: SURGERY | Facility: HOSPITAL | Age: 57
End: 2022-08-25
Payer: COMMERCIAL

## 2022-08-25 ENCOUNTER — HOSPITAL ENCOUNTER (OUTPATIENT)
Facility: HOSPITAL | Age: 57
Setting detail: HOSPITAL OUTPATIENT SURGERY
Discharge: HOME | End: 2022-08-25
Attending: SURGERY | Admitting: SURGERY
Payer: COMMERCIAL

## 2022-08-25 ENCOUNTER — HOSPITAL ENCOUNTER (OUTPATIENT)
Dept: MAMMOGRAPHY | Facility: HOSPITAL | Age: 57
Discharge: HOME | End: 2022-08-25
Attending: SURGERY
Payer: COMMERCIAL

## 2022-08-25 VITALS
OXYGEN SATURATION: 100 % | BODY MASS INDEX: 18.44 KG/M2 | HEART RATE: 71 BPM | WEIGHT: 110.69 LBS | TEMPERATURE: 98 F | DIASTOLIC BLOOD PRESSURE: 76 MMHG | SYSTOLIC BLOOD PRESSURE: 128 MMHG | HEIGHT: 65 IN | RESPIRATION RATE: 14 BRPM

## 2022-08-25 DIAGNOSIS — D05.02 NEOPLASM OF LEFT BREAST, PRIMARY TUMOR STAGING CATEGORY TIS: LOBULAR CARCINOMA IN SITU (LCIS): ICD-10-CM

## 2022-08-25 DIAGNOSIS — Z20.822 ENCOUNTER FOR PREOPERATIVE SCREENING LABORATORY TESTING FOR COVID-19 VIRUS: Primary | ICD-10-CM

## 2022-08-25 DIAGNOSIS — D05.12 DUCTAL CARCINOMA IN SITU (DCIS) OF LEFT BREAST: ICD-10-CM

## 2022-08-25 DIAGNOSIS — Z01.812 ENCOUNTER FOR PREOPERATIVE SCREENING LABORATORY TESTING FOR COVID-19 VIRUS: Primary | ICD-10-CM

## 2022-08-25 PROCEDURE — 0HBT0ZZ EXCISION OF RIGHT BREAST, OPEN APPROACH: ICD-10-PCS | Performed by: SURGERY

## 2022-08-25 PROCEDURE — 19281 PERQ DEVICE BREAST 1ST IMAG: CPT | Performed by: SURGERY

## 2022-08-25 PROCEDURE — 19282 PERQ DEVICE BREAST EA IMAG: CPT | Performed by: SURGERY

## 2022-08-25 PROCEDURE — 88305 TISSUE EXAM BY PATHOLOGIST: CPT | Performed by: SURGERY

## 2022-08-25 PROCEDURE — 88307 TISSUE EXAM BY PATHOLOGIST: CPT | Performed by: SURGERY

## 2022-08-25 PROCEDURE — 76098 X-RAY EXAM SURGICAL SPECIMEN: CPT | Performed by: SURGERY

## 2022-08-25 RX ORDER — SCOLOPAMINE TRANSDERMAL SYSTEM 1 MG/1
1 PATCH, EXTENDED RELEASE TRANSDERMAL ONCE
Status: DISCONTINUED | OUTPATIENT
Start: 2022-08-25 | End: 2022-08-25

## 2022-08-25 RX ORDER — DEXAMETHASONE SODIUM PHOSPHATE 4 MG/ML
VIAL (ML) INJECTION AS NEEDED
Status: DISCONTINUED | OUTPATIENT
Start: 2022-08-25 | End: 2022-08-25 | Stop reason: SURG

## 2022-08-25 RX ORDER — HYDROMORPHONE HYDROCHLORIDE 1 MG/ML
0.2 INJECTION, SOLUTION INTRAMUSCULAR; INTRAVENOUS; SUBCUTANEOUS EVERY 5 MIN PRN
Status: DISCONTINUED | OUTPATIENT
Start: 2022-08-25 | End: 2022-08-25

## 2022-08-25 RX ORDER — HYDROMORPHONE HYDROCHLORIDE 1 MG/ML
0.4 INJECTION, SOLUTION INTRAMUSCULAR; INTRAVENOUS; SUBCUTANEOUS EVERY 5 MIN PRN
Status: DISCONTINUED | OUTPATIENT
Start: 2022-08-25 | End: 2022-08-25

## 2022-08-25 RX ORDER — ONDANSETRON 2 MG/ML
INJECTION INTRAMUSCULAR; INTRAVENOUS AS NEEDED
Status: DISCONTINUED | OUTPATIENT
Start: 2022-08-25 | End: 2022-08-25 | Stop reason: SURG

## 2022-08-25 RX ORDER — BUPIVACAINE HYDROCHLORIDE AND EPINEPHRINE 5; 5 MG/ML; UG/ML
INJECTION, SOLUTION EPIDURAL; INTRACAUDAL; PERINEURAL AS NEEDED
Status: DISCONTINUED | OUTPATIENT
Start: 2022-08-25 | End: 2022-08-25 | Stop reason: HOSPADM

## 2022-08-25 RX ORDER — HYDROMORPHONE HYDROCHLORIDE 1 MG/ML
0.6 INJECTION, SOLUTION INTRAMUSCULAR; INTRAVENOUS; SUBCUTANEOUS EVERY 5 MIN PRN
Status: DISCONTINUED | OUTPATIENT
Start: 2022-08-25 | End: 2022-08-25

## 2022-08-25 RX ORDER — DIAZEPAM 5 MG/1
5 TABLET ORAL AS NEEDED
Status: DISCONTINUED | OUTPATIENT
Start: 2022-08-25 | End: 2022-08-25 | Stop reason: HOSPADM

## 2022-08-25 RX ORDER — KETOROLAC TROMETHAMINE 30 MG/ML
INJECTION, SOLUTION INTRAMUSCULAR; INTRAVENOUS AS NEEDED
Status: DISCONTINUED | OUTPATIENT
Start: 2022-08-25 | End: 2022-08-25 | Stop reason: SURG

## 2022-08-25 RX ORDER — HYDROCODONE BITARTRATE AND ACETAMINOPHEN 5; 325 MG/1; MG/1
1 TABLET ORAL EVERY 6 HOURS PRN
Qty: 10 TABLET | Refills: 0 | Status: SHIPPED | OUTPATIENT
Start: 2022-08-25

## 2022-08-25 RX ORDER — EPHEDRINE SULFATE 50 MG/ML
INJECTION INTRAVENOUS AS NEEDED
Status: DISCONTINUED | OUTPATIENT
Start: 2022-08-25 | End: 2022-08-25 | Stop reason: SURG

## 2022-08-25 RX ORDER — SODIUM CHLORIDE, SODIUM LACTATE, POTASSIUM CHLORIDE, CALCIUM CHLORIDE 600; 310; 30; 20 MG/100ML; MG/100ML; MG/100ML; MG/100ML
INJECTION, SOLUTION INTRAVENOUS CONTINUOUS
Status: DISCONTINUED | OUTPATIENT
Start: 2022-08-25 | End: 2022-08-25

## 2022-08-25 RX ORDER — HYDROCODONE BITARTRATE AND ACETAMINOPHEN 5; 325 MG/1; MG/1
2 TABLET ORAL ONCE AS NEEDED
Status: DISCONTINUED | OUTPATIENT
Start: 2022-08-25 | End: 2022-08-25

## 2022-08-25 RX ORDER — HYDROCODONE BITARTRATE AND ACETAMINOPHEN 5; 325 MG/1; MG/1
1 TABLET ORAL ONCE AS NEEDED
Status: DISCONTINUED | OUTPATIENT
Start: 2022-08-25 | End: 2022-08-25

## 2022-08-25 RX ORDER — HYDROMORPHONE HYDROCHLORIDE 1 MG/ML
INJECTION, SOLUTION INTRAMUSCULAR; INTRAVENOUS; SUBCUTANEOUS
Status: COMPLETED
Start: 2022-08-25 | End: 2022-08-25

## 2022-08-25 RX ORDER — ACETAMINOPHEN 500 MG
1000 TABLET ORAL ONCE
Status: DISCONTINUED | OUTPATIENT
Start: 2022-08-25 | End: 2022-08-25 | Stop reason: HOSPADM

## 2022-08-25 RX ORDER — LIDOCAINE HYDROCHLORIDE 10 MG/ML
INJECTION, SOLUTION EPIDURAL; INFILTRATION; INTRACAUDAL; PERINEURAL AS NEEDED
Status: DISCONTINUED | OUTPATIENT
Start: 2022-08-25 | End: 2022-08-25 | Stop reason: SURG

## 2022-08-25 RX ORDER — ACETAMINOPHEN 500 MG
1000 TABLET ORAL ONCE AS NEEDED
Status: DISCONTINUED | OUTPATIENT
Start: 2022-08-25 | End: 2022-08-25

## 2022-08-25 RX ORDER — ONDANSETRON 2 MG/ML
4 INJECTION INTRAMUSCULAR; INTRAVENOUS EVERY 6 HOURS PRN
Status: DISCONTINUED | OUTPATIENT
Start: 2022-08-25 | End: 2022-08-25

## 2022-08-25 RX ORDER — PROCHLORPERAZINE EDISYLATE 5 MG/ML
5 INJECTION INTRAMUSCULAR; INTRAVENOUS EVERY 8 HOURS PRN
Status: DISCONTINUED | OUTPATIENT
Start: 2022-08-25 | End: 2022-08-25

## 2022-08-25 RX ORDER — NALOXONE HYDROCHLORIDE 0.4 MG/ML
80 INJECTION, SOLUTION INTRAMUSCULAR; INTRAVENOUS; SUBCUTANEOUS AS NEEDED
Status: DISCONTINUED | OUTPATIENT
Start: 2022-08-25 | End: 2022-08-25

## 2022-08-25 RX ORDER — CEFAZOLIN SODIUM/WATER 2 G/20 ML
2 SYRINGE (ML) INTRAVENOUS ONCE
Status: COMPLETED | OUTPATIENT
Start: 2022-08-25 | End: 2022-08-25

## 2022-08-25 RX ADMIN — SODIUM CHLORIDE, SODIUM LACTATE, POTASSIUM CHLORIDE, CALCIUM CHLORIDE: 600; 310; 30; 20 INJECTION, SOLUTION INTRAVENOUS at 09:15:00

## 2022-08-25 RX ADMIN — DEXAMETHASONE SODIUM PHOSPHATE 8 MG: 4 MG/ML VIAL (ML) INJECTION at 09:21:00

## 2022-08-25 RX ADMIN — ONDANSETRON 4 MG: 2 INJECTION INTRAMUSCULAR; INTRAVENOUS at 10:03:00

## 2022-08-25 RX ADMIN — LIDOCAINE HYDROCHLORIDE 50 MG: 10 INJECTION, SOLUTION EPIDURAL; INFILTRATION; INTRACAUDAL; PERINEURAL at 09:18:00

## 2022-08-25 RX ADMIN — KETOROLAC TROMETHAMINE 30 MG: 30 INJECTION, SOLUTION INTRAMUSCULAR; INTRAVENOUS at 10:03:00

## 2022-08-25 RX ADMIN — EPHEDRINE SULFATE 10 MG: 50 INJECTION INTRAVENOUS at 09:49:00

## 2022-08-25 RX ADMIN — CEFAZOLIN SODIUM/WATER 2 G: 2 G/20 ML SYRINGE (ML) INTRAVENOUS at 09:23:00

## 2022-08-25 NOTE — ANESTHESIA PROCEDURE NOTES
Airway  Date/Time: 8/25/2022 9:20 AM  Urgency: Elective      General Information and Staff    Patient location during procedure: OR  Anesthesiologist: Urban Sarmiento MD  Resident/CRNA: Robert Dutta CRNA  Performed: CRNA     Indications and Patient Condition  Indications for airway management: anesthesia  Sedation level: deep  Preoxygenated: yes  Patient position: sniffing  Mask difficulty assessment: 0 - not attempted    Final Airway Details  Final airway type: supraglottic airway      Successful airway: classic  Size 3      Number of attempts at approach: 1    Additional Comments  Atraumatic insertion, dentition and lips a preop

## 2022-08-25 NOTE — ANESTHESIA POSTPROCEDURE EVALUATION
[address] Patient Status:  Hospital Outpatient Surgery   Age/Gender 64year old female MRN [id number]   Location [facility] Attending Shirley Lindo MD   Hosp Day # 0 PCP Hector Cool DO       Anesthesia Post-op Note    RIGHT LUMPECTOMY TIMES 2 WITH X-RAY LOCALIZATION    Procedure Summary     Date: 08/25/22 Room / Location: [address] [place] [address] OR 06 / [facility] OR    Anesthesia Start: 5155 Anesthesia Stop: 4276    Procedure: RIGHT LUMPECTOMY TIMES 2 WITH X-RAY LOCALIZATION (Right Breast) Diagnosis:       Ductal carcinoma in situ (DCIS) of left breast      Neoplasm of left breast, primary tumor staging category Tis: lobular carcinoma in situ (LCIS)      (Ductal carcinoma in situ (DCIS) of left breast [D05.12]Neoplasm of left breast, primary tumor staging category Tis: lobular carcinoma in situ (LCIS) [D05.02])    Surgeons: Shirley Lindo MD Anesthesiologist: Vonnie Payne MD    Anesthesia Type: general ASA Status: 2          Anesthesia Type: general    Vitals Value Taken Time   /75 08/25/22 1021   Temp 98 08/25/22 1023   Pulse 91 08/25/22 1022   Resp 19 08/25/22 1022   SpO2 100 % 08/25/22 1022   Vitals shown include unvalidated device data. Patient Location: PACU    Anesthesia Type: general    Airway Patency: patent    Postop Pain Control: adequate    Mental Status: mildly sedated but able to meaningfully participate in the post-anesthesia evaluation    Nausea/Vomiting: none    Cardiopulmonary/Hydration status: stable euvolemic    Complications: no apparent anesthesia related complications    Postop vital signs: stable    Dental Exam: Unchanged from Preop    Patient to be discharged from PACU when criteria met.

## 2022-08-25 NOTE — OPERATIVE REPORT
[facility]  Op Note    Khris Paez Location: OR   CSN [id number] MRN [id number]   Admission Date 8/25/2022 Operation Date 8/25/2022   Attending Physician Vikki Hudson MD Operating Physician Aurora Dhillon MD     DATE OF OPERATION:  8/25/2022    PREOPERATIVE DIAGNOSIS: Right breast DCIS, right breast LCIS    POSTOPERATIVE DIAGNOSIS: Right breast DCIS, right breast LCIS    PROCEDURE PERFORMED: X-ray localized right breast lumpectomy (11:00) and x-ray localized right breast lumpectomy with attention to margins (4:00). SURGEON:  Aurora Dhillon M.D.    ASSISTANT: Travon Capone PA-C (Her assistance was required to help retract the tissue for adequate dissection and resection of the appropriate tissue. She also helped with wound closure.)    ANESTHESIA:  General    SPECIMEN:  1. Right breast lumpectomy, 11:00  2. Right breast lumpectomy, 4:00  3. Anterior margin, right breast, 4:00  4. Deep margin, right breast, 4:00  5. Medial margin, right breast, 4:00  6. Lateral margin, right breast, 4:00  7. Superior margin, right breast, 4:00  8. Inferior margin, right breast, 4:00    ESTIMATED BLOOD LOSS: 5 mL    COMPLICATIONS: None. INDICATIONS: The patient is a 59-year-old female who underwent a 2 site stereotactic biopsy due to microcalcifications. 11:00 site revealed LCIS with atypical lobular hyperplasia. The 4:00 site revealed DCIS. She was offered excision of both of these areas. The risks, benefits, alternatives were discussed in detail with the patient. Risks included, but were not limited to, seroma development, infection and bleeding. We also discussed the possibility of upstaging of her pathology which would require surgery on another day. She was agreeable to proceed with the operation. PROCEDURE: After informed consent was obtained, the patient was taken to the radiology suite where wires were placed to localize her breast lesions.  She was then brought up to the operating room where general anesthesia was induced. The patient's right breast was prepped and draped in the usual sterile fashion. Starting at the 11 o'clock position, the tissue was locally anesthetized with 0.5% Marcaine with epinephrine. An incision was made through the skin with a 15 blade scalpel. Cautery was used to obtain hemostasis. The tissue that encircled the wire was then grasped. Metzenbaums were used to sharply dissect this tissue free. The wire was delivered into the wound, and the tissue ultimately excised. The specimen was marked long stitch lateral, short stitch superior. Faxitron image revealed the clip within the tissue. Cautery was then used to obtain hemostasis. The wound was irrigated with normal saline. The incision was then closed in 2 layers, using a 3-0 Vicryl in the deep layer and a 4-0 Vicryl in the subcuticular skin. Attention was turned to the 4 o'clock position. The tissue was locally anesthetized with 0.5% Marcaine with epinephrine. An incision was made through the skin with a 15 blade scalpel. Cautery was used to obtain hemostasis. The tissue that encircled the wire was then grasped. Metzenbaums and cautery were used to sharply dissect this tissue free. The wire was delivered into the wound, and the tissue ultimately excised. The specimen was marked long stitch lateral, short stitch superior. Faxitron image revealed the clip within the tissue. The tissue at each of the 6 margins was grasped with an Allis and excised using Metzenbaums. A suture was placed toward the lumpectomy cavity on each of the 6 specimen. Cautery was then used to obtain hemostasis. The wound was irrigated with normal saline. Some of the deep tissue was reapproximated using 3-0 Vicryl. The incision was then closed in 2 layers, using a 3-0 Vicryl in the deep layer and a 4-0 Vicryl in the subcuticular skin. Each of the incisions was infiltrated with local anesthetic.   Steri-Strips were placed across the incisions as well as a sterile dressing. The patient tolerated the procedure well, was extubated in the OR, and was transferred to the PACU in good condition.      Karie Turner MD

## 2022-08-25 NOTE — IMAGING NOTE
Assisted  with mammography guided needle localization of the right breast x 2 sites   [facility] identified with spelling of name and date of birth. Medications and allergies reviewed. NKDA reported    History:     Ductal carcinoma in situ (DCIS) of right breast    lobular carcinoma in situ (LCIS) right  breast  Atypical Lobular Hyperplasia (ALH) right breast   Surgery: RIGHT LUMPECTOMY TIMES 2 WITH X-RAY LOCALIZATION    Order verified. Procedure explained and questions answered. Rose Head verbalized understanding and agreement. 9564: Written consent obtained by imaging staff. 0815: Scans taken by Cornelio Martinez- mammography technologist    8428: Dr. Collette Spray present    2158: Site prepped in a sterile manner. 1711: Time out complete    Site #1 Right Breast  0818: Lidocaine administered for anesthetic affect. 2066: Seltzer 20G x 7.5cm needle placed right breast 11 o' clock position Q shaped clip. Additional images obtained    Site #2 Right breast   8415: Site prepped in a sterile manner   0822: Lidocaine administered for anesthetic affect. 3147: Seltzer 20G x 3cm needle placed right breast 4 o' clock position tophat shaped clip. Emotional support provided. Rose Head tolerated procedure well. Sites cleaned. Wires secured with sterile 4x4 gauze dressing, Transpore tape, and Tegaderm. 0845: [facility] transported via wheelchair to pre-op/surgery holding in stable condition. Ms. Prado Hu without complaints or concerns at this time.

## 2022-08-26 ENCOUNTER — NURSE NAVIGATOR ENCOUNTER (OUTPATIENT)
Dept: HEMATOLOGY/ONCOLOGY | Facility: HOSPITAL | Age: 57
End: 2022-08-26

## 2022-08-26 NOTE — PROGRESS NOTES
Called patient to check on her post-lumpectomy surgery with Dr. Ar Green. Patient reported minimal pain today and that she is doing well. Stated that we will discuss her surgical pathology results next week during our multidisciplinary conference. Scheduled her follow-up with Dr. Ar Green on Tuesday August 30, 2022 in the [facility]. Discussed with the patient about scheduling a medical oncologist appointment with one [facility] that specialize in breast cancer. Scheduled patient with Dr. Campo Late on Tuesday September 6, 2022 in the [facility]. Also, discussed with the patient about scheduling a survivorship appointment with the APN, Anniece Fleischer and what the appointment entails. Scheduled patient on December 1, 2022 at 0900 in the [facility]. Answered all questions to the best of my ability and she thanked me for the phone call and assistance. Pt was provided with the breast nurse navigators contact information and was encouraged to phone with any other questions or concerns.

## 2022-08-30 ENCOUNTER — OFFICE VISIT (OUTPATIENT)
Facility: LOCATION | Age: 57
End: 2022-08-30

## 2022-08-30 ENCOUNTER — OFFICE VISIT (OUTPATIENT)
Dept: HEMATOLOGY/ONCOLOGY | Facility: HOSPITAL | Age: 57
End: 2022-08-30
Attending: SURGERY
Payer: COMMERCIAL

## 2022-08-30 VITALS
WEIGHT: 118 LBS | HEIGHT: 65 IN | BODY MASS INDEX: 19.66 KG/M2 | HEART RATE: 72 BPM | DIASTOLIC BLOOD PRESSURE: 95 MMHG | SYSTOLIC BLOOD PRESSURE: 153 MMHG | OXYGEN SATURATION: 98 % | TEMPERATURE: 98 F | RESPIRATION RATE: 16 BRPM

## 2022-08-30 DIAGNOSIS — D05.01 LOBULAR CARCINOMA IN SITU (LCIS) OF RIGHT BREAST: ICD-10-CM

## 2022-08-30 DIAGNOSIS — D05.11 DUCTAL CARCINOMA IN SITU (DCIS) OF RIGHT BREAST: Primary | ICD-10-CM

## 2022-08-30 PROCEDURE — 99024 POSTOP FOLLOW-UP VISIT: CPT | Performed by: SURGERY

## 2022-08-31 ENCOUNTER — PATIENT OUTREACH (OUTPATIENT)
Facility: LOCATION | Age: 57
End: 2022-08-31

## 2022-09-06 ENCOUNTER — OFFICE VISIT (OUTPATIENT)
Dept: HEMATOLOGY/ONCOLOGY | Facility: HOSPITAL | Age: 57
End: 2022-09-06
Attending: SURGERY
Payer: COMMERCIAL

## 2022-09-06 ENCOUNTER — TELEPHONE (OUTPATIENT)
Dept: RADIATION ONCOLOGY | Facility: HOSPITAL | Age: 57
End: 2022-09-06

## 2022-09-06 VITALS
RESPIRATION RATE: 16 BRPM | HEIGHT: 65 IN | SYSTOLIC BLOOD PRESSURE: 170 MMHG | TEMPERATURE: 99 F | DIASTOLIC BLOOD PRESSURE: 87 MMHG | HEART RATE: 82 BPM | BODY MASS INDEX: 20.06 KG/M2 | WEIGHT: 120.38 LBS | OXYGEN SATURATION: 97 %

## 2022-09-06 DIAGNOSIS — D05.01 LOBULAR CARCINOMA IN SITU (LCIS) OF RIGHT BREAST: ICD-10-CM

## 2022-09-06 DIAGNOSIS — D05.11 DUCTAL CARCINOMA IN SITU (DCIS) OF RIGHT BREAST: Primary | ICD-10-CM

## 2022-09-06 PROCEDURE — 99244 OFF/OP CNSLTJ NEW/EST MOD 40: CPT | Performed by: INTERNAL MEDICINE

## 2022-09-06 NOTE — PROGRESS NOTES
Education Record    Learner:  Patient    Disease / Diagnosis: right DCIS, sp. Lumpectomy. Barriers / Limitations:  None   Comments:    Method:  Discussion   Comments:    General Topics:  Plan of care reviewed   Comments:    Outcome:  Shows understanding   Comments:  LMP 2016  Feeling well since surgery.

## 2022-09-14 ENCOUNTER — APPOINTMENT (OUTPATIENT)
Dept: RADIATION ONCOLOGY | Facility: HOSPITAL | Age: 57
End: 2022-09-14
Attending: RADIOLOGY
Payer: COMMERCIAL

## 2022-09-20 ENCOUNTER — OFFICE VISIT (OUTPATIENT)
Dept: FAMILY MEDICINE CLINIC | Facility: CLINIC | Age: 57
End: 2022-09-20

## 2022-09-20 VITALS
OXYGEN SATURATION: 95 % | DIASTOLIC BLOOD PRESSURE: 80 MMHG | BODY MASS INDEX: 19.66 KG/M2 | TEMPERATURE: 97 F | HEIGHT: 65 IN | WEIGHT: 118 LBS | SYSTOLIC BLOOD PRESSURE: 136 MMHG | RESPIRATION RATE: 16 BRPM | HEART RATE: 73 BPM

## 2022-09-20 DIAGNOSIS — D05.11 DUCTAL CARCINOMA IN SITU (DCIS) OF RIGHT BREAST: Primary | ICD-10-CM

## 2022-09-20 DIAGNOSIS — D22.9 NUMEROUS SKIN MOLES: ICD-10-CM

## 2022-09-20 DIAGNOSIS — B07.9 VERRUCA VULGARIS: Primary | ICD-10-CM

## 2022-09-20 DIAGNOSIS — Z12.4 SCREENING FOR CERVICAL CANCER: ICD-10-CM

## 2022-09-20 PROBLEM — R03.0 ELEVATED BLOOD PRESSURE READING IN OFFICE WITHOUT DIAGNOSIS OF HYPERTENSION: Status: RESOLVED | Noted: 2019-04-15 | Resolved: 2022-09-20

## 2022-09-20 PROBLEM — Z01.419 WELL WOMAN EXAM WITH ROUTINE GYNECOLOGICAL EXAM: Status: RESOLVED | Noted: 2018-10-23 | Resolved: 2022-09-20

## 2022-09-20 PROCEDURE — 3079F DIAST BP 80-89 MM HG: CPT | Performed by: FAMILY MEDICINE

## 2022-09-20 PROCEDURE — 3075F SYST BP GE 130 - 139MM HG: CPT | Performed by: FAMILY MEDICINE

## 2022-09-20 PROCEDURE — 99212 OFFICE O/P EST SF 10 MIN: CPT | Performed by: FAMILY MEDICINE

## 2022-09-20 PROCEDURE — 3008F BODY MASS INDEX DOCD: CPT | Performed by: FAMILY MEDICINE

## 2022-09-20 RX ORDER — LETROZOLE 2.5 MG/1
2.5 TABLET, FILM COATED ORAL DAILY
Qty: 30 TABLET | Refills: 3 | Status: SHIPPED | OUTPATIENT
Start: 2022-09-20 | End: 2022-09-20 | Stop reason: ALTCHOICE

## 2022-09-21 ENCOUNTER — HOSPITAL ENCOUNTER (OUTPATIENT)
Dept: RADIATION ONCOLOGY | Facility: HOSPITAL | Age: 57
Discharge: HOME | End: 2022-09-21
Attending: RADIOLOGY

## 2022-09-21 VITALS
BODY MASS INDEX: 19.72 KG/M2 | WEIGHT: 118.38 LBS | OXYGEN SATURATION: 98 % | HEART RATE: 70 BPM | TEMPERATURE: 98 F | HEIGHT: 65 IN | RESPIRATION RATE: 16 BRPM

## 2022-09-21 DIAGNOSIS — D05.11 DUCTAL CARCINOMA IN SITU (DCIS) OF RIGHT BREAST: Primary | ICD-10-CM

## 2022-09-21 DIAGNOSIS — D05.01 LOBULAR CARCINOMA IN SITU (LCIS) OF RIGHT BREAST: ICD-10-CM

## 2022-09-21 PROBLEM — D22.9 NUMEROUS SKIN MOLES: Status: ACTIVE | Noted: 2022-09-21

## 2022-09-21 PROCEDURE — 99214 OFFICE O/P EST MOD 30 MIN: CPT

## 2022-09-21 NOTE — CONSULTS
[facility] RADIATION ONCOLOGY CONSULTATION     PATIENT:   Brittany Ocasio MD:  Michael Jimenez MD      DIAGNOSIS:   Tis N0 M0 right breast      CC:    DCIS    HPI   63 yo here for consult  Had been on OCP x 8 years for menopause symptoms. Mammogram 6/3/22 shows cluster of calcs in LIQ of R breast and in UOQ. Stereo bx of both sites 22   -, , grade 2 DCIS from 4:00, 2.5 mm largest focus, 5% of submitted tissue  -LCIS and ALH from 11:00    MRI 22  -no additional worrisome findings    Lumpectomy x 2 on 22  -residual LCIS from 11:00  -no residual DCIS from 4:00, including shave margins    Has stopped her OCP; starting to feel HFs again    PMH  -hx of HPV    PSH  -appendectomy, lumpectomy    MEDS  -no prescription meds    No Known Allergies     SH  -, lives in [place], non smoker    FH  -no breast, ovarian CA    ROS  -HFs    PHYSICAL EXAM   ECO  GEN:  Well appearing. No acute distress. HEENT:  No supraclavicular adenopathy. CHEST:  Regular rate and rhythm. ABDOMEN: Soft, non-tender. EXT:  No edema. NEURO:  Alert, oriented. Cranial nerves grossly intact.      IMPRESSION:   63 yo s/p local excision of 11:00 and 4:00 in R breast    -11:00 area shows LCIS  -4:00 area shows small focus, <1 cm, of grade 2 DCIS, with good surgical margins  -,   -hx of osteopenia on Dexa    Very low risk DCIS- perhaps a favorable outlier even within the low risk group    Probably will benefit more overall with AI, given the LCIS and strong  positivity, since there are gains bilaterally in risk reduction; roughly 5% reduction on the right and 5% on the left in absolute terms (without radiation)    Residual right side IBRT risk with 5 years of AI would be very low, perhaps 5%, so RT, to reduce this to about 2% -- probably not worth having RT    We follow patients closely so a recurrence would be either DCIS or stage I IDC in the vast majority of patients    One caveat, that she may not have a lot of breast tissue remaining for a second lumpectomy in the future if she were to recur, but still, the benefit of RT in this case in the face of endocrine therapy is probably very small    She is concerned about QOL on endocrine therapy, but with LCIS, it is still probably better for her to take AI or anderson rather than RT    Yvonne Goldstein MD  Radiation Oncology    CC:  MD Becky Nelson MD

## 2022-10-11 ENCOUNTER — PATIENT MESSAGE (OUTPATIENT)
Dept: FAMILY MEDICINE CLINIC | Facility: CLINIC | Age: 57
End: 2022-10-11

## 2022-10-12 NOTE — TELEPHONE ENCOUNTER
Patient really should schedule an office visit so we can discuss her recurrent BV and potential treatments. I appreciate her detailed note but we could respond and address this in an OV especially if she is having pelvic discomfort. If she is having symptoms at the appointment I could also do additional testing to confirm that it is BV and to rule out anything else.   Please inform

## 2022-10-12 NOTE — TELEPHONE ENCOUNTER
From: Jaymie Head  To: Nuvia Winter DO  Sent: 10/11/2022 11:17 AM CDT  Subject: Re-occuring BV    I am in between OB GYNs at the moment. I am having what seems to be a re-occurring bacterial vaginosis every month. Jan 2022 I stopped taking birth control. Within a week I came down with BV, I used [employer] thru my employer to discuss symptons and get a prescription meteronidazole. Problem went away after antibiotics. Feb 2022 hot flashes came back to severely and coincidentally ob gyn re activated my birth control pill so i went back on it and hot flashes disappeared. Jul 2022 OBGYN OFFICE staff called after breast cancer diagnosis and told me to stop taking them immediately (7/18/22) which I did. Aug 7 2022 BV symptoms started again, called [employer] and prescribed same med again and went away. Sept (between 9/6-9/10) symptoms came back again and I wasn't supposed to but i saved a few pills from 8/7 prescription and took them, it went away  10/10/2022 BV symptoms coming on again - i have 3 or 4 days of meds left to take. Along with the symptoms of foul discharge with sometimes yellow appearance, i also get a feeling of discomfort in my pelvic area like when a period would be happening however i have not have a period since 2016. Would there be a concern that this happens every month about the same time with both symptoms - they're the only two i get the awful discharge & pelvic uncomfortable=ness. In July, i had internal ultrasound as the only way i could describe this was at times this was happening, i almost feel like my fallopian tube is swollen. Thank you.

## 2022-10-28 ENCOUNTER — OFFICE VISIT (OUTPATIENT)
Dept: FAMILY MEDICINE CLINIC | Facility: CLINIC | Age: 57
End: 2022-10-28
Payer: COMMERCIAL

## 2022-10-28 VITALS
BODY MASS INDEX: 19.86 KG/M2 | RESPIRATION RATE: 20 BRPM | WEIGHT: 119.19 LBS | HEART RATE: 84 BPM | OXYGEN SATURATION: 100 % | HEIGHT: 65 IN | SYSTOLIC BLOOD PRESSURE: 118 MMHG | TEMPERATURE: 97 F | DIASTOLIC BLOOD PRESSURE: 70 MMHG

## 2022-10-28 DIAGNOSIS — B07.9 VERRUCA VULGARIS: Primary | ICD-10-CM

## 2022-10-28 DIAGNOSIS — N76.0 RECURRENT VAGINITIS: ICD-10-CM

## 2022-10-28 PROCEDURE — 3008F BODY MASS INDEX DOCD: CPT | Performed by: FAMILY MEDICINE

## 2022-10-28 PROCEDURE — 17110 DESTRUCTION B9 LES UP TO 14: CPT | Performed by: FAMILY MEDICINE

## 2022-10-28 PROCEDURE — 99213 OFFICE O/P EST LOW 20 MIN: CPT | Performed by: FAMILY MEDICINE

## 2022-10-28 PROCEDURE — 3074F SYST BP LT 130 MM HG: CPT | Performed by: FAMILY MEDICINE

## 2022-10-28 PROCEDURE — 3078F DIAST BP <80 MM HG: CPT | Performed by: FAMILY MEDICINE

## 2022-10-28 RX ORDER — METRONIDAZOLE 7.5 MG/G
GEL VAGINAL
Qty: 70 G | Refills: 5 | Status: SHIPPED | OUTPATIENT
Start: 2022-10-28

## 2022-10-28 RX ORDER — LETROZOLE 2.5 MG/1
2.5 TABLET, FILM COATED ORAL DAILY
COMMUNITY
Start: 2022-10-18

## 2022-10-28 RX ORDER — NITROFURANTOIN MACROCRYSTALS 50 MG/1
50 CAPSULE ORAL EVERY EVENING
COMMUNITY
Start: 2022-09-20

## 2022-11-11 ENCOUNTER — PATIENT MESSAGE (OUTPATIENT)
Dept: FAMILY MEDICINE CLINIC | Facility: CLINIC | Age: 57
End: 2022-11-11

## 2022-11-11 NOTE — TELEPHONE ENCOUNTER
From: Chary Head  To: Memo Guzman DO  Sent: 11/11/2022 2:11 PM CST  Subject: Wellness Screening Results    Good Afternoon, attached are my results from the wellness screening for my upcoming physical for your review. I was asked by Dr. Jaquelin Griffin (who prescribed Letrazole) to send these to you as my ALT enzyme is so high compared to all other years she wanted to make sure you had a copy also and to advise anything further. She did ask me to stop taking Letrazole and recheck labs in a month. Thanks, any questions please let me know!

## 2022-11-15 ENCOUNTER — MED REC SCAN ONLY (OUTPATIENT)
Dept: FAMILY MEDICINE CLINIC | Facility: CLINIC | Age: 57
End: 2022-11-15

## 2022-11-15 LAB
ABSOLUTE BASOPHILS: 0.1
ABSOLUTE EOSINOPHILS: 0.1
ABSOLUTE LYMPHOCYTES: 1.4
ABSOLUTE MONOCYTES: 0.4
ABSOLUTE NEUTROPHILS: 3.1
ALBUMIN: 4.9 G/DL
ALKALINE PHOSPHATASE: 86
AST (SGOT): 38 IU/L
BASOS: 1 %
BILIRUBIN, DIRECT: 0.12 MG/DL
BILIRUBIN, TOTAL: 0.4 MG/DL
BUN/CREATININE RATIO: 21
BUN: 17
CALCIUM: 9.8
CHLORIDE: 99
CHOLESTEROL, TOTAL: 215
CREATININE: 0.81 MG/DL (ref 0.6–1.2)
EGFR IF NONAFRICN AM: 85
EOS: 2 %
FSH: 78
GGT: 23 IU/L
GLUCOSE: 92
HCT: 40.4
HDL CHOL: 60
HGB: 14
IMMATURE GRANS (ABS): 0
IMMATURE GRANULOCYTES: 0
IRON: 129
LDH: 172 IU/L
LDL-C (NIH CALC): 131
LH: 51.5
LYMPHS: 27 %
MAGNESIUM: 2.2
MCH: 32.3 PG
MCV: 93 FL
MONOCYTES: 8 %
NEUTROPHILS: 62 %
NON HDL CHOL: 155
PHOSPHORUS: 4.4
PLATELETS: 273 X10E3/UL
POTASSIUM: 4.8
PROTEIN, TOTAL: 7.5
RDW: 11.8 %
RED BLOOD COUNT: 4.33
SGOT (AST): 67
SODIUM: 138
T. CHOL/HDL RATIO: 3.6
THYROXINE (T4): 6.3 ΜG/DL
TRIGLYCERIDES: 133 MG/DL
URIC ACID: 4.4
WBC: 5

## 2022-11-17 ENCOUNTER — TELEPHONE (OUTPATIENT)
Dept: HEMATOLOGY/ONCOLOGY | Facility: HOSPITAL | Age: 57
End: 2022-11-17

## 2022-11-17 NOTE — TELEPHONE ENCOUNTER
Patient calling regarding her appt on 12/01/2022. Is not really sure what the appt is about.     If you could please give her a call    Much appreciated

## 2022-11-29 RX ORDER — NITROFURANTOIN MACROCRYSTALS 50 MG/1
CAPSULE ORAL
Qty: 30 CAPSULE | Refills: 1 | Status: SHIPPED | OUTPATIENT
Start: 2022-11-29

## 2022-12-01 ENCOUNTER — APPOINTMENT (OUTPATIENT)
Dept: HEMATOLOGY/ONCOLOGY | Facility: HOSPITAL | Age: 57
End: 2022-12-01
Attending: SURGERY
Payer: COMMERCIAL

## 2022-12-02 ENCOUNTER — OFFICE VISIT (OUTPATIENT)
Dept: FAMILY MEDICINE CLINIC | Facility: CLINIC | Age: 57
End: 2022-12-02
Payer: COMMERCIAL

## 2022-12-02 VITALS
OXYGEN SATURATION: 97 % | DIASTOLIC BLOOD PRESSURE: 62 MMHG | BODY MASS INDEX: 20.49 KG/M2 | HEIGHT: 64.29 IN | WEIGHT: 120 LBS | SYSTOLIC BLOOD PRESSURE: 110 MMHG | TEMPERATURE: 98 F | HEART RATE: 80 BPM | RESPIRATION RATE: 18 BRPM

## 2022-12-02 DIAGNOSIS — R74.01 ELEVATED ALT MEASUREMENT: ICD-10-CM

## 2022-12-02 DIAGNOSIS — G89.29 CHRONIC RIGHT SHOULDER PAIN: ICD-10-CM

## 2022-12-02 DIAGNOSIS — E83.39 SERUM PHOSPHATE ELEVATED: ICD-10-CM

## 2022-12-02 DIAGNOSIS — Z23 NEED FOR VACCINATION: ICD-10-CM

## 2022-12-02 DIAGNOSIS — M25.511 CHRONIC RIGHT SHOULDER PAIN: ICD-10-CM

## 2022-12-02 DIAGNOSIS — Z00.00 ANNUAL PHYSICAL EXAM: Primary | ICD-10-CM

## 2022-12-02 PROBLEM — R79.89 SERUM PHOSPHATE ELEVATED: Status: ACTIVE | Noted: 2022-12-02

## 2022-12-02 PROCEDURE — 3074F SYST BP LT 130 MM HG: CPT | Performed by: FAMILY MEDICINE

## 2022-12-02 PROCEDURE — 3008F BODY MASS INDEX DOCD: CPT | Performed by: FAMILY MEDICINE

## 2022-12-02 PROCEDURE — 99396 PREV VISIT EST AGE 40-64: CPT | Performed by: FAMILY MEDICINE

## 2022-12-02 PROCEDURE — 3078F DIAST BP <80 MM HG: CPT | Performed by: FAMILY MEDICINE

## 2022-12-02 RX ORDER — CELECOXIB 200 MG/1
200 CAPSULE ORAL 2 TIMES DAILY
Qty: 28 CAPSULE | Refills: 0 | Status: SHIPPED | OUTPATIENT
Start: 2022-12-02

## 2022-12-09 ENCOUNTER — LAB ENCOUNTER (OUTPATIENT)
Dept: LAB | Age: 57
End: 2022-12-09
Attending: FAMILY MEDICINE
Payer: COMMERCIAL

## 2022-12-09 DIAGNOSIS — E83.39 SERUM PHOSPHATE ELEVATED: ICD-10-CM

## 2022-12-09 DIAGNOSIS — R74.8 ELEVATED LIVER ENZYMES: ICD-10-CM

## 2022-12-09 LAB
ALBUMIN SERPL-MCNC: 3.7 G/DL (ref 3.4–5)
ALP LIVER SERPL-CCNC: 80 U/L
ALT SERPL-CCNC: 114 U/L
AST SERPL-CCNC: 66 U/L (ref 15–37)
BILIRUB DIRECT SERPL-MCNC: 0.1 MG/DL (ref 0–0.2)
BILIRUB SERPL-MCNC: 0.4 MG/DL (ref 0.1–2)
PHOSPHATE SERPL-MCNC: 3.9 MG/DL (ref 2.5–4.9)
PROT SERPL-MCNC: 7.2 G/DL (ref 6.4–8.2)

## 2022-12-09 PROCEDURE — 84100 ASSAY OF PHOSPHORUS: CPT | Performed by: FAMILY MEDICINE

## 2022-12-12 ENCOUNTER — PATIENT MESSAGE (OUTPATIENT)
Dept: FAMILY MEDICINE CLINIC | Facility: CLINIC | Age: 57
End: 2022-12-12

## 2022-12-12 DIAGNOSIS — R79.89 ELEVATED LFTS: Primary | ICD-10-CM

## 2022-12-13 NOTE — TELEPHONE ENCOUNTER
From: Iman Head  To: Juan Richardson DO  Sent: 12/12/2022 3:16 PM CST  Subject: Liver enzyme results    Just an fyi --Dr. Elif Aly reviewed the blood work and liver enzymes are even higher than in November but we have ruled out that the letrazole probably was not a factor in this and has ordered an ultrasound on my liver so now this will more or less fall back to you my PCP! I have scheduled the ultrasound for 12/14 so look for the results and we will go from there. Thanks, no need to respond.

## 2022-12-14 ENCOUNTER — HOSPITAL ENCOUNTER (OUTPATIENT)
Dept: ULTRASOUND IMAGING | Age: 57
Discharge: HOME | End: 2022-12-14
Attending: INTERNAL MEDICINE
Payer: COMMERCIAL

## 2022-12-14 DIAGNOSIS — R74.8 ELEVATED LIVER ENZYMES: ICD-10-CM

## 2022-12-14 PROCEDURE — 76700 US EXAM ABDOM COMPLETE: CPT | Performed by: INTERNAL MEDICINE

## 2022-12-14 NOTE — TELEPHONE ENCOUNTER
Update appreciated. Have her complete her ultrasound of the liver ASAP. Patient has met her deductible for the year  I would like her to try to get an appointment if possible with GI prior to the end of the year  I placed a referral in care everywhere for Dr. Nicola oconnor to help determine cause    At her appointment on 12/2022 we will review her liver and I may order additional testing to facilitate care. Have her schedule a consult with GI even if she sees an APRN before the end of the year    Please inform  Thank you    Please follow-up with:    Dr. Nayeli Mclaughlin, [address] [place] [address] Gastroenterology       [place] [id number], [id number]  [address], [address] [place] [address]    Phone: [phone]  Fax: [phone].

## 2022-12-21 ENCOUNTER — TELEPHONE (OUTPATIENT)
Dept: HEMATOLOGY/ONCOLOGY | Facility: HOSPITAL | Age: 57
End: 2022-12-21

## 2022-12-22 ENCOUNTER — OFFICE VISIT (OUTPATIENT)
Dept: HEMATOLOGY/ONCOLOGY | Facility: HOSPITAL | Age: 57
End: 2022-12-22
Attending: NURSE PRACTITIONER
Payer: COMMERCIAL

## 2022-12-22 ENCOUNTER — OFFICE VISIT (OUTPATIENT)
Dept: FAMILY MEDICINE CLINIC | Facility: CLINIC | Age: 57
End: 2022-12-22
Payer: COMMERCIAL

## 2022-12-22 VITALS
RESPIRATION RATE: 16 BRPM | WEIGHT: 119.81 LBS | TEMPERATURE: 98 F | HEIGHT: 65 IN | OXYGEN SATURATION: 97 % | SYSTOLIC BLOOD PRESSURE: 108 MMHG | BODY MASS INDEX: 19.96 KG/M2 | DIASTOLIC BLOOD PRESSURE: 60 MMHG | HEART RATE: 85 BPM

## 2022-12-22 DIAGNOSIS — Z85.3 PERSONAL HISTORY OF BREAST CANCER: Primary | ICD-10-CM

## 2022-12-22 DIAGNOSIS — B07.9 VERRUCA VULGARIS: Primary | ICD-10-CM

## 2022-12-22 DIAGNOSIS — R74.01 ELEVATED ALT MEASUREMENT: ICD-10-CM

## 2022-12-22 DIAGNOSIS — Z71.9 COUNSELING, UNSPECIFIED: ICD-10-CM

## 2022-12-22 DIAGNOSIS — Z08 ENCOUNTER FOR FOLLOW-UP EXAMINATION AFTER COMPLETED TREATMENT FOR MALIGNANT NEOPLASM: ICD-10-CM

## 2022-12-22 PROBLEM — E83.39 LOW PHOSPHATE LEVELS: Status: RESOLVED | Noted: 2021-12-06 | Resolved: 2022-12-22

## 2022-12-22 PROCEDURE — 3078F DIAST BP <80 MM HG: CPT | Performed by: FAMILY MEDICINE

## 2022-12-22 PROCEDURE — 99215 OFFICE O/P EST HI 40 MIN: CPT | Performed by: NURSE PRACTITIONER

## 2022-12-22 PROCEDURE — 99212 OFFICE O/P EST SF 10 MIN: CPT | Performed by: FAMILY MEDICINE

## 2022-12-22 PROCEDURE — 3008F BODY MASS INDEX DOCD: CPT | Performed by: FAMILY MEDICINE

## 2022-12-22 PROCEDURE — 11400 EXC TR-EXT B9+MARG 0.5 CM<: CPT | Performed by: FAMILY MEDICINE

## 2022-12-22 PROCEDURE — 3074F SYST BP LT 130 MM HG: CPT | Performed by: FAMILY MEDICINE

## 2022-12-22 PROCEDURE — 99417 PROLNG OP E/M EACH 15 MIN: CPT | Performed by: NURSE PRACTITIONER

## 2022-12-22 NOTE — PROGRESS NOTES
I met with Karely Jimenez for a Survivorship Clinic visit to provide a survivorship care plan (SCP) and information related to post-treatment care. She was diagnosed with right breast DCIS in 7/2022. She has no significant family history but had been taking oral contraceptives for many years, more recently since 2016 for hot flash management and this was discontinued at diagnosis. Dr. Shahram Garcia performed a right lumpectomy on 8/25/2022. Pathology revealed at 11:00 ALH/LCIS and at 4:00 no residual malignancy, ER+, AK+, Gr2 with negative margins. She was seen by Dr. Natalia Woodruff for radiation consult, but radiation therapy was not indicated. Dr. Urszula Fitzgerald saw Karely Jimenez and recommended adjuvant endocrine therapy with Letrozole which she started on 9/26/2022 (See Oncology Treatment Summary SCP for details). Current condition/issues: Rose had wellness labs drawn at work that revealed elevated liver function tests. She had been taking Letrozole for approximately a month and was instructed by Dr. Woodrow Anderson to hold the drug for 4 weeks to see if related. Rose had repeat bloodwork that showed no improvement with ,AST 64 with normal bilirubin and alkaline phosphatase. She was referred to Gastroenterology for evaluation, will get further blood work evaluation and abdomen/liver MRI on 12/29. She resumed the Letrozole a week ago. Since discontinuing the oral contraceptives, Rose started having hot flashes. With Letrozole these have worsened with at least 2 hot flashes/hour during the day and night, sometimes drenching and her sleep is interrupted. She takes OTC sleeping aides, which sometimes work. She has bilateral shoulder discomfort that pre-dates her starting Letrozole and recently took Celebrex per her PCP, with little improvement. She otherwise has no joint discomfort or other noted side effects. Prior bone density in 12/2021 showed osteopenia.   She is taking supplemental Vitamin D3 and Calcium. She has no other post-treatment issues. She exercises with a  but not consistently. She reports eating healthy and current BMI is 19. She denies anxiety or depression and reports feeling grateful for her early stage breast cancer. Survivorship Care Plan and letter: She was provided a hard copy of the SCP and a letter that outlined the purpose of the visit and plan. We reviewed all elements of both documents. She is aware that a letter and SCP will be sent to her PCP, Dr. Jaylin Scott. Reviewed Cancer Surveillance (office visits, imaging, bone density) and that Dr. Bianka Borjas will oversee this care. She will schedule a follow-up visit with Dr. Mariane Peabody for March. She is due for bilateral mammogram in February followed by a Dr. Maryanne Mohs office visit. Reviewed Schedule of other clinic visits with Primary Care and specialists: Dr. Cammy Silva will continue to manage all general health care recommended for age and gender including cancer screening tests. She is up-to-date with cancer screening and will get derm skin screen before year end. She was encouraged to continue to see specialists at usual intervals. Reviewed concerning symptoms that she should report to any Provider. Reviewed possible late and long-term effects related to the treatment that was received. We reviewed management of hormone related side effects. We discussed some medications that can be prescribed for hot flashes, but she prefers to wait and see if things improve with time. Reviewed common cancer survivor issues and resources available. She appears to be doing well. Various survivorship resources were provided  to use going forward as needed (see below). Reviewed Lifestyle/behaviors that can affect ongoing health, including the risk for the cancer coming back or developing another cancer.   We reviewed importance of physical activity including aerobic, strength training and weight bearing exercise. We reviewed a plant based diet. We reviewed skin care and sun safety. The patient received a take-home folder that included the following survivorship resources:   -SCP and patient letter  -Breast Survivorship Guide   -[facility] - nutrition and exercise classes, mind/body programs, education, support groups  -[facility]- education, support groups, special programs, nutrition and exercise classes, movement classes, mind/body programs,  survivorship programs, individual counseling  -[employer] handout-nutrition, physical activity  -ACS Cancer Screening Guidelines  -Websites: [address] for your Life, Living Beyond Breast Cancer, Facebook: [employer], Endocrine therapy, Subha Hurtado was given the opportunity to ask questions. She had few questions and appeared to have a good understanding of the information we discussed today. My total time spent caring for the patient on the day of the encounter: 70 minutes (10 minutes reviewing chart, 50 minutes of face to face counseling regarding survivorship education, review of care plan and additional resources and 10 minutes of documentation). She was encouraged to call with any further questions.    Rachel Langston, APRN

## 2022-12-23 ENCOUNTER — LAB ENCOUNTER (OUTPATIENT)
Dept: LAB | Age: 57
End: 2022-12-23
Attending: INTERNAL MEDICINE
Payer: COMMERCIAL

## 2022-12-23 DIAGNOSIS — R74.8 ELEVATED LIVER ENZYMES: ICD-10-CM

## 2022-12-23 LAB
A1AT SERPL-MCNC: 142 MG/DL (ref 90–200)
CERULOPLASMIN SERPL-MCNC: 26 MG/DL (ref 20–60)
DEPRECATED HBV CORE AB SER IA-ACNC: 69.2 NG/ML
HAV AB SER QL IA: NONREACTIVE
HBV CORE AB SERPL QL IA: NONREACTIVE
HBV SURFACE AB SER QL: NONREACTIVE
HBV SURFACE AB SERPL IA-ACNC: <3.1 MIU/ML
HBV SURFACE AG SER-ACNC: <0.1 [IU]/L
HBV SURFACE AG SERPL QL IA: NONREACTIVE
HCV AB SERPL QL IA: NONREACTIVE
IRON SATN MFR SERPL: 34 %
IRON SERPL-MCNC: 148 UG/DL
TIBC SERPL-MCNC: 440 UG/DL (ref 240–450)
TRANSFERRIN SERPL-MCNC: 295 MG/DL (ref 200–360)

## 2022-12-23 PROCEDURE — 82103 ALPHA-1-ANTITRYPSIN TOTAL: CPT

## 2022-12-23 PROCEDURE — 83516 IMMUNOASSAY NONANTIBODY: CPT

## 2022-12-23 PROCEDURE — 86704 HEP B CORE ANTIBODY TOTAL: CPT

## 2022-12-23 PROCEDURE — 83540 ASSAY OF IRON: CPT

## 2022-12-23 PROCEDURE — 86364 TISS TRNSGLTMNASE EA IG CLAS: CPT

## 2022-12-23 PROCEDURE — 87340 HEPATITIS B SURFACE AG IA: CPT

## 2022-12-23 PROCEDURE — 86706 HEP B SURFACE ANTIBODY: CPT

## 2022-12-23 PROCEDURE — 86708 HEPATITIS A ANTIBODY: CPT

## 2022-12-23 PROCEDURE — 86803 HEPATITIS C AB TEST: CPT

## 2022-12-23 PROCEDURE — 36415 COLL VENOUS BLD VENIPUNCTURE: CPT

## 2022-12-23 PROCEDURE — 82390 ASSAY OF CERULOPLASMIN: CPT

## 2022-12-23 PROCEDURE — 83550 IRON BINDING TEST: CPT

## 2022-12-23 PROCEDURE — 82728 ASSAY OF FERRITIN: CPT

## 2022-12-26 LAB — TTG IGA SER-ACNC: 0.3 U/ML (ref ?–7)

## 2022-12-29 ENCOUNTER — HOSPITAL ENCOUNTER (OUTPATIENT)
Dept: MRI IMAGING | Age: 57
Discharge: HOME | End: 2022-12-29
Attending: INTERNAL MEDICINE
Payer: COMMERCIAL

## 2022-12-29 DIAGNOSIS — K76.9 LIVER LESION: ICD-10-CM

## 2022-12-29 PROCEDURE — 74183 MRI ABD W/O CNTR FLWD CNTR: CPT | Performed by: INTERNAL MEDICINE

## 2022-12-29 PROCEDURE — A9575 INJ GADOTERATE MEGLUMI 0.1ML: HCPCS | Performed by: INTERNAL MEDICINE

## 2022-12-29 RX ORDER — DIPHENHYDRAMINE HYDROCHLORIDE 50 MG/ML
10 INJECTION, SOLUTION INTRAMUSCULAR; INTRAVENOUS
Status: COMPLETED | OUTPATIENT
Start: 2022-12-29 | End: 2022-12-29

## 2022-12-29 RX ADMIN — DIPHENHYDRAMINE HYDROCHLORIDE 10 ML: 50 INJECTION, SOLUTION INTRAMUSCULAR; INTRAVENOUS at 11:02:00

## 2023-01-04 ENCOUNTER — PATIENT MESSAGE (OUTPATIENT)
Dept: FAMILY MEDICINE CLINIC | Facility: CLINIC | Age: 58
End: 2023-01-04

## 2023-01-04 NOTE — TELEPHONE ENCOUNTER
From: Mario Head  To: Kelli Prescott DO  Sent: 1/4/2023 9:08 AM CST  Subject: Referrel Request    Good Morning,   on my 12/22 visit, I failed to review my shoulder issue from earlier in December where you prescribed Celebrex for two weeks. ...there has been no change in shoulder. You have given me great referral on specialists so I'm wondering if it's time to go to and orthopedic to further look into my shoulder pain. Could you please let me know any you would have in mind? You've given great referrals on doctors so I'd like your opinion rather than going in blind! Thanks in advance!   Janae Alegre

## 2023-01-05 ENCOUNTER — TELEPHONE (OUTPATIENT)
Dept: ORTHOPEDICS CLINIC | Facility: CLINIC | Age: 58
End: 2023-01-05

## 2023-01-05 DIAGNOSIS — M25.512 LEFT SHOULDER PAIN, UNSPECIFIED CHRONICITY: Primary | ICD-10-CM

## 2023-01-26 ENCOUNTER — OFFICE VISIT (OUTPATIENT)
Dept: OBGYN CLINIC | Facility: CLINIC | Age: 58
End: 2023-01-26
Payer: COMMERCIAL

## 2023-01-26 VITALS
HEIGHT: 65 IN | HEART RATE: 101 BPM | DIASTOLIC BLOOD PRESSURE: 80 MMHG | BODY MASS INDEX: 20.13 KG/M2 | WEIGHT: 120.81 LBS | SYSTOLIC BLOOD PRESSURE: 124 MMHG

## 2023-01-26 DIAGNOSIS — Z01.419 WELL WOMAN EXAM WITH ROUTINE GYNECOLOGICAL EXAM: Primary | ICD-10-CM

## 2023-01-26 DIAGNOSIS — N76.0 RECURRENT VAGINITIS: ICD-10-CM

## 2023-01-26 PROCEDURE — 3008F BODY MASS INDEX DOCD: CPT | Performed by: NURSE PRACTITIONER

## 2023-01-26 PROCEDURE — 3074F SYST BP LT 130 MM HG: CPT | Performed by: NURSE PRACTITIONER

## 2023-01-26 PROCEDURE — 3079F DIAST BP 80-89 MM HG: CPT | Performed by: NURSE PRACTITIONER

## 2023-01-26 PROCEDURE — 99396 PREV VISIT EST AGE 40-64: CPT | Performed by: NURSE PRACTITIONER

## 2023-02-07 ENCOUNTER — HOSPITAL ENCOUNTER (OUTPATIENT)
Dept: GENERAL RADIOLOGY | Age: 58
Discharge: HOME | End: 2023-02-07
Attending: ORTHOPAEDIC SURGERY
Payer: COMMERCIAL

## 2023-02-07 ENCOUNTER — OFFICE VISIT (OUTPATIENT)
Dept: ORTHOPEDICS CLINIC | Facility: CLINIC | Age: 58
End: 2023-02-07
Payer: COMMERCIAL

## 2023-02-07 VITALS — HEIGHT: 65 IN | WEIGHT: 115 LBS | BODY MASS INDEX: 19.16 KG/M2

## 2023-02-07 DIAGNOSIS — M75.02 ADHESIVE CAPSULITIS OF LEFT SHOULDER: Primary | ICD-10-CM

## 2023-02-07 DIAGNOSIS — M25.512 LEFT SHOULDER PAIN, UNSPECIFIED CHRONICITY: ICD-10-CM

## 2023-02-07 PROCEDURE — 73030 X-RAY EXAM OF SHOULDER: CPT | Performed by: ORTHOPAEDIC SURGERY

## 2023-02-07 RX ORDER — TRIAMCINOLONE ACETONIDE 40 MG/ML
40 INJECTION, SUSPENSION INTRA-ARTICULAR; INTRAMUSCULAR ONCE
Status: SHIPPED | OUTPATIENT
Start: 2023-02-07

## 2023-02-07 NOTE — PROGRESS NOTES
Patient is a 71-year-old white female with left shoulder pain. Patient states that she does not recall any specific injury. Patient states the problems been going on since the fall. Patient feels like things have progressed a bit over the last couple months. Patient denies any problem with her right shoulder. Patient's exam shows have about 120 degrees of elevation and about 100 degrees of abduction of the left shoulder. She has pain at the extremes of motion. Neurologically she is intact in the upper extremities to light touch. Handgrip strength grossly 5/5. Good cap refill in all fingers. X-rays of her left shoulder shows a type III acromion. Well-maintained subacromial space. Glenohumeral joint well-maintained. Impression is that of adhesive capsulitis of the left shoulder. Recommendations go ahead with an intra-articular cortisone injection. This was done under sterile technique through a posterior medial approach with 2 cc of Xylocaine and Marcaine and 40 mg of Kenalog. Patient tolerated the injection well. I did give the patient exercises do on her own for the next few weeks but if she is not progressing well with this injection I would like her to see therapy on a formal basis. Follow-up with me in about 4 to 6 weeks time.

## 2023-02-20 ENCOUNTER — HOSPITAL ENCOUNTER (OUTPATIENT)
Dept: MAMMOGRAPHY | Facility: HOSPITAL | Age: 58
Discharge: HOME | End: 2023-02-20
Attending: SURGERY
Payer: COMMERCIAL

## 2023-02-20 DIAGNOSIS — D05.11 DUCTAL CARCINOMA IN SITU (DCIS) OF RIGHT BREAST: ICD-10-CM

## 2023-02-20 DIAGNOSIS — D05.01 LOBULAR CARCINOMA IN SITU (LCIS) OF RIGHT BREAST: ICD-10-CM

## 2023-02-20 PROCEDURE — 77062 BREAST TOMOSYNTHESIS BI: CPT | Performed by: SURGERY

## 2023-02-20 PROCEDURE — 77066 DX MAMMO INCL CAD BI: CPT | Performed by: SURGERY

## 2023-03-07 ENCOUNTER — OFFICE VISIT (OUTPATIENT)
Dept: ORTHOPEDICS CLINIC | Facility: CLINIC | Age: 58
End: 2023-03-07
Payer: COMMERCIAL

## 2023-03-07 VITALS — BODY MASS INDEX: 19.16 KG/M2 | WEIGHT: 115 LBS | HEIGHT: 65 IN

## 2023-03-07 DIAGNOSIS — M75.02 ADHESIVE CAPSULITIS OF LEFT SHOULDER: Primary | ICD-10-CM

## 2023-03-07 PROCEDURE — 3008F BODY MASS INDEX DOCD: CPT | Performed by: ORTHOPAEDIC SURGERY

## 2023-03-07 PROCEDURE — 99212 OFFICE O/P EST SF 10 MIN: CPT | Performed by: ORTHOPAEDIC SURGERY

## 2023-03-07 NOTE — PROGRESS NOTES
Patient is a 59-year-old female here for follow-up on her left shoulder. Patient had the injection of her glenohumeral joint at her last visit which has substantially improved her function. Patient has been doing exercise on her own and has been progressing well. Patient's exam shows about 160 degrees of passive elevation. Minimal to mild impingement with this maneuver. Patient can reach her left gluteal region with her hand. Impression is that of resolving adhesive capsulitis of the left shoulder. Recommendations for to continue with her exercises slowly as tolerated. Follow-up with me in 6 weeks or so. If having any residual issues might consider a repeat injection. Otherwise if making continued progress no other intervention is needed.

## 2023-03-17 ENCOUNTER — OFFICE VISIT (OUTPATIENT)
Dept: HEMATOLOGY/ONCOLOGY | Facility: HOSPITAL | Age: 58
End: 2023-03-17
Attending: NURSE PRACTITIONER
Payer: COMMERCIAL

## 2023-03-17 VITALS
DIASTOLIC BLOOD PRESSURE: 98 MMHG | WEIGHT: 121 LBS | TEMPERATURE: 98 F | BODY MASS INDEX: 20 KG/M2 | SYSTOLIC BLOOD PRESSURE: 178 MMHG | HEART RATE: 85 BPM | OXYGEN SATURATION: 100 % | RESPIRATION RATE: 16 BRPM

## 2023-03-17 DIAGNOSIS — D05.11 DUCTAL CARCINOMA IN SITU (DCIS) OF RIGHT BREAST: Primary | ICD-10-CM

## 2023-03-17 DIAGNOSIS — T50.905A ADVERSE EFFECT OF DRUG, INITIAL ENCOUNTER: ICD-10-CM

## 2023-03-17 PROCEDURE — 99214 OFFICE O/P EST MOD 30 MIN: CPT | Performed by: INTERNAL MEDICINE

## 2023-03-17 NOTE — PROGRESS NOTES
Education Record    Learner:  Patient    Disease / Diagnosis:right DCIS    Barriers / Limitations:  None   Comments:    Method:  Discussion   Comments:    General Topics:  Plan of care reviewed   Comments:      Outcome:  Shows understanding   Comments:  Breast imaging up to date. Taking letrozole. Some joint pain, and hot flashes. Hot flashes had gotten bad when she discontinued BCP. Generalized joint pain, has shoulder issue but not related to starting letrozole.

## 2023-05-19 ENCOUNTER — NURSE ONLY (OUTPATIENT)
Dept: FAMILY MEDICINE CLINIC | Facility: CLINIC | Age: 58
End: 2023-05-19
Payer: COMMERCIAL

## 2023-05-19 PROCEDURE — 90471 IMMUNIZATION ADMIN: CPT | Performed by: FAMILY MEDICINE

## 2023-05-19 PROCEDURE — 90750 HZV VACC RECOMBINANT IM: CPT | Performed by: FAMILY MEDICINE

## 2023-05-31 ENCOUNTER — PATIENT OUTREACH (OUTPATIENT)
Facility: LOCATION | Age: 58
End: 2023-05-31

## 2023-05-31 DIAGNOSIS — R92.1 BREAST CALCIFICATIONS: Primary | ICD-10-CM

## 2023-06-26 ENCOUNTER — TELEPHONE (OUTPATIENT)
Dept: HEMATOLOGY/ONCOLOGY | Age: 58
End: 2023-06-26

## 2023-06-26 DIAGNOSIS — D05.11 DUCTAL CARCINOMA IN SITU (DCIS) OF RIGHT BREAST: Primary | ICD-10-CM

## 2023-06-26 DIAGNOSIS — T50.905A ADVERSE EFFECT OF DRUG, INITIAL ENCOUNTER: ICD-10-CM

## 2023-06-26 NOTE — TELEPHONE ENCOUNTER
Pt is calling to request lab order be faxed to [address] [place] [address] at [phone].  Pt is there now-NL

## 2023-06-26 NOTE — TELEPHONE ENCOUNTER
Labs needed verified with Dr Nanette Beauchamp. LVM for patient to let her know   Orders faxed to [employer], and copy emailed to patient email listed in her chart.

## 2023-06-28 LAB
ALBUMIN/GLOBULIN RATIO: 1.9 (CALC) (ref 1–2.5)
ALBUMIN: 4.7 G/DL (ref 3.6–5.1)
ALKALINE PHOSPHATASE: 92 U/L (ref 37–153)
ALT: 19 U/L (ref 6–29)
AST: 21 U/L (ref 10–35)
BILIRUBIN, TOTAL: 0.5 MG/DL (ref 0.2–1.2)
BUN: 16 MG/DL (ref 7–25)
CALCIUM: 9.8 MG/DL (ref 8.6–10.4)
CARBON DIOXIDE: 30 MMOL/L (ref 20–32)
CHLORIDE: 100 MMOL/L (ref 98–110)
CREATININE: 0.76 MG/DL (ref 0.5–1.03)
EGFR: 91 ML/MIN/1.73M2
GLOBULIN: 2.5 G/DL (CALC) (ref 1.9–3.7)
GLUCOSE: 98 MG/DL (ref 65–99)
POTASSIUM: 4.5 MMOL/L (ref 3.5–5.3)
PROTEIN, TOTAL: 7.2 G/DL (ref 6.1–8.1)
SODIUM: 138 MMOL/L (ref 135–146)

## 2023-06-29 ENCOUNTER — TELEPHONE (OUTPATIENT)
Dept: HEMATOLOGY/ONCOLOGY | Facility: HOSPITAL | Age: 58
End: 2023-06-29

## 2023-07-04 LAB — VITAMIN D, 25-OH, TOTAL: 48 NG/ML (ref 30–100)

## 2023-07-05 NOTE — PROGRESS NOTES
[facility] Progress Note      Patient Name:  Ran York  YOB: 1965  Medical Record Number:  [id number]    Date of visit:  3/17/2023    CHIEF COMPLAINT: R breast DCIS    HPI:     62year old that I initially saw in 9/22 after she underwent work-up of abnormal mammogram.  Biopsy 7/22- 2.5 mm grade 2 DCIS, ER/ME positive. Lumpectomy 8/22-only ALH/LCIS. Met with radiation oncology and the decision was taken to defer RT. She started letrozole 11/22. Labs showed elevated liver enzymes. Met with GI and resumed letrozole 12/22. Presents for evaluation. Some hot flashes but she had these prior to starting letrozole. Had been on OCPs for over 12 years, stopped 7/22. No joint pain. SOCIAL HISTORY:    ROS:     Constitutional: no fever, chills fatigue,night sweats or weight loss  Neurologic: no headache, seizures, diplopia or weakness  Respiratory: no cough, SOB or hemoptysis  Cardiovascular: no chest pain, ankle swelling, PACK  GI: no nausea, vomiting, diarrhea or BRBPR  Musculoskeletal: no body-ache or joint pain  Dermatologic: no alopecia, rash, pruritis  : no hematuria, dysuria or frequency  Psych: no confusion or depression   Heme: no easy bruising or bleeding     ALLERGIES:  No Known Allergies    MEDICATIONS:    Current Outpatient Medications   Medication Sig Dispense Refill    letrozole 2.5 MG Oral Tab Take 1 tablet (2.5 mg total) by mouth daily. 90 tablet 1    Calcium Carbonate-Vitamin D (CALTRATE 600+D OR) Take by mouth. NITROFURANTOIN MACROCRYSTAL 50 MG Oral Cap TAKE 1 CAPSULE BY MOUTH EVERY DAY IN THE EVENING (Patient not taking: Reported on 3/17/2023) 30 capsule 1    Multiple Vitamin (MULTI-VITAMIN DAILY) Oral Tab Take 1 tablet by mouth daily. Acidophilus/Pectin Oral Cap Take 1 capsule by mouth daily.          VITALS:  Height: --  Weight: --  BSA (Calculated - sq m): --  Pulse: --  BP: --  Temp: --  Do Not Use - Resp Rate: --  SpO2: --    PS:  ECOG: 0    PHYSICAL EXAM:    General: alert and oriented x 3, not in acute distress. HEENT: MIN, oropharynx  clear. Neck: supple. No JVD /adenopathy. CVS: S1S2, regular  Rhythm, no murmurs. Lungs: Clear to auscultation and percussion. Abdomen: Soft, non tender, no hepato-splenomegaly. Extremities:  No edema. CNS: no focal deficit    Emotional well being: Patient's emotional well being was assessed. No issues requiring acute psychosocial intervention were identified. LABS:     No results found for this or any previous visit (from the past 24 hour(s)). ASSESSMENT AND PLAN:     # R breast DCIS: s/p R breast bx 7/22-0.5 mm grade 2 DCIS, 100% ER/WV. Lumpectomy 8/22 only residual ALH/LCIS. RT was deferred. She started letrozole 11/22. Held briefly for elevated LFTs. Then resumed again. Plan to continue till 11/27. Has few hot flashes but they preceded letrozole. We discussed stopping medication or starting supportive medications like venlafaxine but she wishes to hold off at this time. She is motivated to complete letrozole. # Osteopenia: DEXA 12/21 showed osteopenia with T score -1.2. Repeat DEXA 12/23. # Elevated liver enzymes: Seen by GI. MRI liver 12/22 benign cyst but otherwise unremarkable. Normalized. ORDERS PLACED:    No follow-ups on file. Tania Floyd M.D.     [facility]    [address], [address]    3/17/2023

## 2023-07-07 ENCOUNTER — APPOINTMENT (OUTPATIENT)
Dept: HEMATOLOGY/ONCOLOGY | Facility: HOSPITAL | Age: 58
End: 2023-07-07
Attending: NURSE PRACTITIONER
Payer: COMMERCIAL

## 2023-08-07 ENCOUNTER — HOSPITAL ENCOUNTER (OUTPATIENT)
Dept: MAMMOGRAPHY | Facility: HOSPITAL | Age: 58
Discharge: HOME | End: 2023-08-07
Attending: SURGERY
Payer: COMMERCIAL

## 2023-08-07 DIAGNOSIS — R92.1 BREAST CALCIFICATIONS: ICD-10-CM

## 2023-08-07 PROCEDURE — 77061 BREAST TOMOSYNTHESIS UNI: CPT | Performed by: SURGERY

## 2023-08-07 PROCEDURE — 77065 DX MAMMO INCL CAD UNI: CPT | Performed by: SURGERY

## 2023-08-07 PROCEDURE — 76642 ULTRASOUND BREAST LIMITED: CPT | Performed by: SURGERY

## 2023-08-22 ENCOUNTER — OFFICE VISIT (OUTPATIENT)
Dept: HEMATOLOGY/ONCOLOGY | Facility: HOSPITAL | Age: 58
End: 2023-08-22
Attending: SURGERY
Payer: COMMERCIAL

## 2023-08-22 ENCOUNTER — OFFICE VISIT (OUTPATIENT)
Facility: LOCATION | Age: 58
End: 2023-08-22
Payer: COMMERCIAL

## 2023-08-22 VITALS
WEIGHT: 118 LBS | DIASTOLIC BLOOD PRESSURE: 96 MMHG | HEART RATE: 88 BPM | RESPIRATION RATE: 16 BRPM | TEMPERATURE: 98 F | HEIGHT: 65 IN | OXYGEN SATURATION: 99 % | SYSTOLIC BLOOD PRESSURE: 162 MMHG | BODY MASS INDEX: 19.66 KG/M2

## 2023-08-22 DIAGNOSIS — D05.01 LOBULAR CARCINOMA IN SITU (LCIS) OF RIGHT BREAST: ICD-10-CM

## 2023-08-22 DIAGNOSIS — D05.11 DUCTAL CARCINOMA IN SITU (DCIS) OF RIGHT BREAST: Primary | ICD-10-CM

## 2023-08-22 PROCEDURE — 99211 OFF/OP EST MAY X REQ PHY/QHP: CPT

## 2023-08-22 PROCEDURE — 99213 OFFICE O/P EST LOW 20 MIN: CPT | Performed by: SURGERY

## 2023-08-22 NOTE — PROGRESS NOTES
Subjective:   Patient ID: Ant Mancini is a 62year old female who follows up for 6-month surveillance. She underwent a 2 site lumpectomy of her right breast, performed August 25, 2022. The tissue at 11:00 was excised due to LCIS. The 4:00 tissue was excised due to DCIS. She deferred RT. She has been on letrozole since November 2022. She has noticed no further changes to her breasts. Overall, she is without complaints. She recently had a skin mole excised from her left breast by dermatology. HPI    History/Other:   Review of Systems  Current Outpatient Medications   Medication Sig Dispense Refill    letrozole 2.5 MG Oral Tab Take 1 tablet (2.5 mg total) by mouth daily. 90 tablet 1    Calcium Carbonate-Vitamin D (CALTRATE 600+D OR) Take by mouth. NITROFURANTOIN MACROCRYSTAL 50 MG Oral Cap TAKE 1 CAPSULE BY MOUTH EVERY DAY IN THE EVENING (Patient taking differently: Take 1 capsule (50 mg total) by mouth nightly as needed (patient only taking as needed). ) 30 capsule 1    Multiple Vitamin (MULTI-VITAMIN DAILY) Oral Tab Take 1 tablet by mouth daily. Acidophilus/Pectin Oral Cap Take 1 capsule by mouth daily. Allergies:No Known Allergies    Objective:   Physical Exam  Vitals and nursing note reviewed. Constitutional:       General: She is not in acute distress. Appearance: She is well-developed. She is not diaphoretic. HENT:      Head: Normocephalic and atraumatic. Eyes:      General: No scleral icterus. Conjunctiva/sclera: Conjunctivae normal.      Pupils: Pupils are equal, round, and reactive to light. Neck:      Vascular: No JVD. Trachea: Trachea normal.   Chest:      Chest wall: No mass. Breasts:     Right: No inverted nipple, mass, nipple discharge, skin change or tenderness. Left: No inverted nipple, mass, nipple discharge, skin change or tenderness.           Comments: Right breast incisions well-healed  Left breast incision well-healed  Musculoskeletal: Cervical back: Full passive range of motion without pain and neck supple. Lymphadenopathy:      Upper Body:      Right upper body: No axillary or pectoral adenopathy. Left upper body: No axillary or pectoral adenopathy. Neurological:      Mental Status: She is alert and oriented to person, place, and time. Psychiatric:         Speech: Speech normal.         Behavior: Behavior normal.       MAMMOGRAM   I personally viewed the films and agree with the radiologist's interpretation. FINDINGS:    There are benign post lumpectomy changes in the lower inner right breast.  There are benign-appearing postsurgical changes in the upper outer right breast. A square marker has been placed at the site of the palpable area of concern, overlying the upper  posterior breast.  There is no subjacent mammographic abnormality. Given the palpable area, further evaluation with ultrasound will be performed. The parenchymal distribution, which is nodular in appearance, is without suspicious interval change from prior studies. Few scattered benign-appearing calcifications are redemonstrated. There are no spiculated masses, areas of nonsurgical distortion, or   suspicious grouped calcifications in the right breast.     Targeted ultrasound of the patient's palpable area of concern was performed, located in the right axillary region. Normal appearing breast tissue is seen with no suspicious cystic or solid lesion identified. Impression   CONCLUSION:  There are no mammographic or sonographic findings to explain the etiology of the patient detected palpable area of concern in the RIGHT breast/axilla. Clinical follow up and management recommended.        Benign postsurgical and post lumpectomy changes of the RIGHT breast.  Per routine post lumpectomy follow-up imaging protocol, the patient should return in 6 months for follow-up imaging of the right breast and annual mammography of the left breast.     The findings and recommendations were discussed by myself with the patient at the end of the exam.           BI-RADS CATEGORY:    DIAGNOSTIC CATEGORY 2--BENIGN FINDING:       RECOMMENDATIONS:    SHORT TERM FOLLOW-UP DIAGNOSTIC MAMMOGRAM BILATERAL BREASTS IN 6 MONTHS. A letter explaining the results in lay terms has been sent to the patient. This exam was evaluated with a computer-aided device. This patient's information has been entered into a reminder system with a target due date for the next mammogram.        LOCATION:  [facility]              Dictated by (CST): Len Sesay MD on 8/07/2023 at 1:21 PM           Assessment & Plan:   Ductal carcinoma in situ (DCIS) of right breast  (primary encounter diagnosis)  Lobular carcinoma in situ (LCIS) of right breast    Imaging & Referrals:  [facility] LARRY 2D+3D DIAGNOSTIC [facility]  BILAT (WJI=[id number])    The patient has a benign mammogram and exam.  She will be due for a bilateral mammogram in 6 months. The order was placed at today's visit. She will see me after her next imaging for continued surveillance.     Satya Jonas MD

## 2023-08-28 ENCOUNTER — PATIENT OUTREACH (OUTPATIENT)
Facility: LOCATION | Age: 58
End: 2023-08-28

## 2023-09-08 ENCOUNTER — NURSE ONLY (OUTPATIENT)
Dept: FAMILY MEDICINE CLINIC | Facility: CLINIC | Age: 58
End: 2023-09-08
Payer: COMMERCIAL

## 2023-09-08 PROCEDURE — 90750 HZV VACC RECOMBINANT IM: CPT | Performed by: FAMILY MEDICINE

## 2023-09-08 PROCEDURE — 90471 IMMUNIZATION ADMIN: CPT | Performed by: FAMILY MEDICINE

## 2023-10-30 ENCOUNTER — OFFICE VISIT (OUTPATIENT)
Dept: FAMILY MEDICINE CLINIC | Facility: CLINIC | Age: 58
End: 2023-10-30

## 2023-10-30 ENCOUNTER — PATIENT MESSAGE (OUTPATIENT)
Dept: FAMILY MEDICINE CLINIC | Facility: CLINIC | Age: 58
End: 2023-10-30

## 2023-10-30 VITALS
TEMPERATURE: 98 F | DIASTOLIC BLOOD PRESSURE: 80 MMHG | RESPIRATION RATE: 20 BRPM | HEART RATE: 75 BPM | OXYGEN SATURATION: 99 % | HEIGHT: 65 IN | SYSTOLIC BLOOD PRESSURE: 136 MMHG | BODY MASS INDEX: 20.06 KG/M2 | WEIGHT: 120.38 LBS

## 2023-10-30 DIAGNOSIS — M79.671 RIGHT FOOT PAIN: ICD-10-CM

## 2023-10-30 DIAGNOSIS — F51.04 PSYCHOPHYSIOLOGICAL INSOMNIA: ICD-10-CM

## 2023-10-30 DIAGNOSIS — M25.50 POLYARTHRALGIA: Primary | ICD-10-CM

## 2023-10-30 DIAGNOSIS — N95.1 HOT FLASHES DUE TO MENOPAUSE: ICD-10-CM

## 2023-10-30 DIAGNOSIS — T14.8XXA CRUSH INJURY: ICD-10-CM

## 2023-10-30 DIAGNOSIS — Z23 NEED FOR VACCINATION: ICD-10-CM

## 2023-10-30 DIAGNOSIS — M21.611 BUNION OF GREAT TOE OF RIGHT FOOT: ICD-10-CM

## 2023-10-30 PROCEDURE — 3079F DIAST BP 80-89 MM HG: CPT | Performed by: FAMILY MEDICINE

## 2023-10-30 PROCEDURE — 3075F SYST BP GE 130 - 139MM HG: CPT | Performed by: FAMILY MEDICINE

## 2023-10-30 PROCEDURE — 99214 OFFICE O/P EST MOD 30 MIN: CPT | Performed by: FAMILY MEDICINE

## 2023-10-30 PROCEDURE — 3008F BODY MASS INDEX DOCD: CPT | Performed by: FAMILY MEDICINE

## 2023-10-30 RX ORDER — VENLAFAXINE HYDROCHLORIDE 37.5 MG/1
CAPSULE, EXTENDED RELEASE ORAL
Qty: 49 CAPSULE | Refills: 0 | Status: SHIPPED | OUTPATIENT
Start: 2023-10-30

## 2023-10-30 NOTE — TELEPHONE ENCOUNTER
From: Chary Head  To: Memo Guzman  Sent: 10/30/2023 1:23 PM CDT  Subject: Scheduled blood test    I have scheduled tests from today's visit for the panels for Rheumatoid arthritis. My appt is 11/6 at the [address] [place] [address] located [address] [place] [address]. Does someone need to send over the order for the blood work? I had issues last year that they did not receive from one of my doctors and I was not aware that was needed so I'm bein proactive! Their #'s [phone]; GFZ#[phone].  Thanks

## 2023-11-14 LAB
ANACHOICE(R) SCREEN: NEGATIVE
C-REACTIVE PROTEIN: 38.4 MG/L
CYCLIC CITRULLINATED$PEPTIDE (CCP) AB (IGG): <16 UNITS
RHEUMATOID FACTOR: 248 IU/ML
SED RATE BY MODIFIED$WESTERGREN: 6 MM/H

## 2023-11-20 ENCOUNTER — PATIENT MESSAGE (OUTPATIENT)
Dept: FAMILY MEDICINE CLINIC | Facility: CLINIC | Age: 58
End: 2023-11-20

## 2023-11-20 RX ORDER — NITROFURANTOIN MACROCRYSTALS 50 MG/1
50 CAPSULE ORAL EVERY EVENING
Qty: 30 CAPSULE | Refills: 1 | OUTPATIENT
Start: 2023-11-20

## 2023-11-20 NOTE — TELEPHONE ENCOUNTER
Please see my chart message from pt and advise. Pt is asking if blood work alone confirms that pt has rheumatoid arthritis.

## 2023-11-21 NOTE — TELEPHONE ENCOUNTER
Please have patient's schedule a follow-up in the office. We can go over testing and answer her questions. I think this would be most beneficial.  She will feel more at ease.     Thank you

## 2023-12-08 ENCOUNTER — OFFICE VISIT (OUTPATIENT)
Dept: FAMILY MEDICINE CLINIC | Facility: CLINIC | Age: 58
End: 2023-12-08
Payer: COMMERCIAL

## 2023-12-08 VITALS
HEIGHT: 65.6 IN | BODY MASS INDEX: 19.55 KG/M2 | RESPIRATION RATE: 20 BRPM | OXYGEN SATURATION: 98 % | HEART RATE: 82 BPM | SYSTOLIC BLOOD PRESSURE: 128 MMHG | WEIGHT: 120.19 LBS | TEMPERATURE: 98 F | DIASTOLIC BLOOD PRESSURE: 76 MMHG

## 2023-12-08 DIAGNOSIS — N95.1 HOT FLASHES DUE TO MENOPAUSE: ICD-10-CM

## 2023-12-08 DIAGNOSIS — M21.611 BUNION OF GREAT TOE OF RIGHT FOOT: ICD-10-CM

## 2023-12-08 DIAGNOSIS — M85.859 OSTEOPENIA OF NECK OF FEMUR, UNSPECIFIED LATERALITY: ICD-10-CM

## 2023-12-08 DIAGNOSIS — Z00.00 ANNUAL PHYSICAL EXAM: Primary | ICD-10-CM

## 2023-12-08 DIAGNOSIS — Z13.6 SCREENING, ISCHEMIC HEART DISEASE: ICD-10-CM

## 2023-12-08 DIAGNOSIS — F51.04 PSYCHOPHYSIOLOGICAL INSOMNIA: ICD-10-CM

## 2023-12-08 DIAGNOSIS — R74.01 ELEVATED ALT MEASUREMENT: ICD-10-CM

## 2023-12-08 DIAGNOSIS — D05.11 DUCTAL CARCINOMA IN SITU (DCIS) OF RIGHT BREAST: ICD-10-CM

## 2023-12-08 DIAGNOSIS — M25.50 POLYARTHRALGIA: ICD-10-CM

## 2023-12-08 DIAGNOSIS — N39.0 RECURRENT UTI: ICD-10-CM

## 2023-12-08 PROBLEM — T14.8XXA CRUSH INJURY: Status: RESOLVED | Noted: 2023-10-30 | Resolved: 2023-12-08

## 2023-12-08 LAB
ALBUMIN SERPL-MCNC: 4.3 G/DL (ref 3.4–5)
ALBUMIN/GLOB SERPL: 1.3 {RATIO} (ref 1–2)
ALP LIVER SERPL-CCNC: 95 U/L
ALT SERPL-CCNC: 26 U/L
ANION GAP SERPL CALC-SCNC: 4 MMOL/L (ref 0–18)
AST SERPL-CCNC: 22 U/L (ref 15–37)
BASOPHILS # BLD AUTO: 0.07 X10(3) UL (ref 0–0.2)
BASOPHILS NFR BLD AUTO: 1 %
BILIRUB SERPL-MCNC: 0.4 MG/DL (ref 0.1–2)
BUN BLD-MCNC: 19 MG/DL (ref 9–23)
CALCIUM BLD-MCNC: 9.8 MG/DL (ref 8.5–10.1)
CHLORIDE SERPL-SCNC: 103 MMOL/L (ref 98–112)
CHOLEST SERPL-MCNC: 196 MG/DL (ref ?–200)
CO2 SERPL-SCNC: 30 MMOL/L (ref 21–32)
CREAT BLD-MCNC: 0.86 MG/DL
EGFRCR SERPLBLD CKD-EPI 2021: 78 ML/MIN/1.73M2 (ref 60–?)
EOSINOPHIL # BLD AUTO: 0.08 X10(3) UL (ref 0–0.7)
EOSINOPHIL NFR BLD AUTO: 1.1 %
ERYTHROCYTE [DISTWIDTH] IN BLOOD BY AUTOMATED COUNT: 11.9 %
FASTING PATIENT LIPID ANSWER: NO
FASTING STATUS PATIENT QL REPORTED: NO
GLOBULIN PLAS-MCNC: 3.4 G/DL (ref 2.8–4.4)
GLUCOSE BLD-MCNC: 106 MG/DL (ref 70–99)
HCT VFR BLD AUTO: 41.6 %
HDLC SERPL-MCNC: 58 MG/DL (ref 40–59)
HGB BLD-MCNC: 13.7 G/DL
IMM GRANULOCYTES # BLD AUTO: 0.02 X10(3) UL (ref 0–1)
IMM GRANULOCYTES NFR BLD: 0.3 %
LDLC SERPL CALC-MCNC: 116 MG/DL (ref ?–100)
LYMPHOCYTES # BLD AUTO: 2.13 X10(3) UL (ref 1–4)
LYMPHOCYTES NFR BLD AUTO: 29.3 %
MCH RBC QN AUTO: 31.2 PG (ref 26–34)
MCHC RBC AUTO-ENTMCNC: 32.9 G/DL (ref 31–37)
MCV RBC AUTO: 94.8 FL
MONOCYTES # BLD AUTO: 0.46 X10(3) UL (ref 0.1–1)
MONOCYTES NFR BLD AUTO: 6.3 %
NEUTROPHILS # BLD AUTO: 4.52 X10 (3) UL (ref 1.5–7.7)
NEUTROPHILS # BLD AUTO: 4.52 X10(3) UL (ref 1.5–7.7)
NEUTROPHILS NFR BLD AUTO: 62 %
NONHDLC SERPL-MCNC: 138 MG/DL (ref ?–130)
OSMOLALITY SERPL CALC.SUM OF ELEC: 287 MOSM/KG (ref 275–295)
PLATELET # BLD AUTO: 247 10(3)UL (ref 150–450)
POTASSIUM SERPL-SCNC: 4.7 MMOL/L (ref 3.5–5.1)
PROT SERPL-MCNC: 7.7 G/DL (ref 6.4–8.2)
RBC # BLD AUTO: 4.39 X10(6)UL
SODIUM SERPL-SCNC: 137 MMOL/L (ref 136–145)
TRIGL SERPL-MCNC: 124 MG/DL (ref 30–149)
TSI SER-ACNC: 1.12 MIU/ML (ref 0.36–3.74)
VLDLC SERPL CALC-MCNC: 22 MG/DL (ref 0–30)
WBC # BLD AUTO: 7.3 X10(3) UL (ref 4–11)

## 2023-12-08 PROCEDURE — 3008F BODY MASS INDEX DOCD: CPT | Performed by: FAMILY MEDICINE

## 2023-12-08 PROCEDURE — 80061 LIPID PANEL: CPT | Performed by: FAMILY MEDICINE

## 2023-12-08 PROCEDURE — 3078F DIAST BP <80 MM HG: CPT | Performed by: FAMILY MEDICINE

## 2023-12-08 PROCEDURE — 80050 GENERAL HEALTH PANEL: CPT | Performed by: FAMILY MEDICINE

## 2023-12-08 PROCEDURE — 99396 PREV VISIT EST AGE 40-64: CPT | Performed by: FAMILY MEDICINE

## 2023-12-08 PROCEDURE — 3074F SYST BP LT 130 MM HG: CPT | Performed by: FAMILY MEDICINE

## 2023-12-08 NOTE — PROGRESS NOTES
Patient came in for draw of ordered non fasting labs. Patient drawn out of right arm  AC, x 1 attempt and tolerated well.  1 SST ( green) 1 Lavender  tube drawn.

## 2023-12-09 PROBLEM — M85.859 OSTEOPENIA OF NECK OF FEMUR: Status: ACTIVE | Noted: 2023-12-09

## 2023-12-11 ENCOUNTER — PATIENT MESSAGE (OUTPATIENT)
Dept: FAMILY MEDICINE CLINIC | Facility: CLINIC | Age: 58
End: 2023-12-11

## 2023-12-12 NOTE — TELEPHONE ENCOUNTER
From: Heri Head  To: Mylene Pepe  Sent: 12/11/2023 9:52 AM CST  Subject: [facility] Test order    Good morning,  I rec'd several emails confirming test order has been sent to [facility]. Is the only test left to do is Vitamin D check? Looks as if everything else has been done. The [facility] website does not show what test order sent. Also, on my visit last friday I mentioned a Cancer screening test our insurance is mentioning to do, it's called [facility], [facility] Cancer test. I couldn't remember the name of it but I looked in the email.    thanks and please confirm [facility] order.

## 2023-12-13 NOTE — TELEPHONE ENCOUNTER
Nursing call [facility] and find out lab # for [id number] screen.     It is not available at [facility]

## 2023-12-15 NOTE — TELEPHONE ENCOUNTER
Patient states her employer informed her of this [facility] test.  Patient may speak to her employer and ask if they have any other information at this point I have nothing further to offer her.     If she like to go talk to a  and discuss genetic testing for cancer I can give her a referral.

## 2023-12-15 NOTE — TELEPHONE ENCOUNTER
Called [facility] [phone] spoke to Júnior. She could not find Galleri or GRAIL test.     Júnior recommended searching under \"cancer\" in their test directory. Gave two examples:    BRCA test code 57322     (GI tumor markers) test code 6785    Sent [facility] to pt to inform we are still working on this.

## 2023-12-18 ENCOUNTER — TELEPHONE (OUTPATIENT)
Dept: HEMATOLOGY/ONCOLOGY | Facility: HOSPITAL | Age: 58
End: 2023-12-18

## 2023-12-18 DIAGNOSIS — R73.01 ELEVATED FASTING GLUCOSE: Primary | ICD-10-CM

## 2023-12-18 NOTE — TELEPHONE ENCOUNTER
Patient called and needs her lab order sent to [address] [place] [address] in [employer] on [address] [employer]. Thank you.

## 2023-12-19 ENCOUNTER — OFFICE VISIT (OUTPATIENT)
Dept: PODIATRY CLINIC | Facility: CLINIC | Age: 58
End: 2023-12-19

## 2023-12-19 DIAGNOSIS — M79.672 LEFT FOOT PAIN: ICD-10-CM

## 2023-12-19 DIAGNOSIS — M77.42 METATARSALGIA, LEFT FOOT: Primary | ICD-10-CM

## 2023-12-19 DIAGNOSIS — M25.50 POLYARTHRALGIA: ICD-10-CM

## 2023-12-19 DIAGNOSIS — M20.12 HALLUX VALGUS, LEFT: ICD-10-CM

## 2023-12-19 LAB
ABSOLUTE BASOPHILS: 82 CELLS/UL (ref 0–200)
ABSOLUTE EOSINOPHILS: 101 CELLS/UL (ref 15–500)
ABSOLUTE LYMPHOCYTES: 2045 CELLS/UL (ref 850–3900)
ABSOLUTE MONOCYTES: 461 CELLS/UL (ref 200–950)
ABSOLUTE NEUTROPHILS: 2112 CELLS/UL (ref 1500–7800)
ALBUMIN/GLOBULIN RATIO: 1.7 (CALC) (ref 1–2.5)
ALBUMIN: 4.6 G/DL (ref 3.6–5.1)
ALKALINE PHOSPHATASE: 97 U/L (ref 37–153)
ALT: 16 U/L (ref 6–29)
AST: 21 U/L (ref 10–35)
BASOPHILS: 1.7 %
BILIRUBIN, TOTAL: 0.6 MG/DL (ref 0.2–1.2)
BUN: 19 MG/DL (ref 7–25)
CALCIUM: 10.1 MG/DL (ref 8.6–10.4)
CARBON DIOXIDE: 31 MMOL/L (ref 20–32)
CHLORIDE: 97 MMOL/L (ref 98–110)
CHOL/HDLC RATIO: 3.3 (CALC)
CHOLESTEROL, TOTAL: 207 MG/DL
CREATININE: 0.74 MG/DL (ref 0.5–1.03)
EGFR: 94 ML/MIN/1.73M2
EOSINOPHILS: 2.1 %
GLOBULIN: 2.7 G/DL (CALC) (ref 1.9–3.7)
GLUCOSE: 94 MG/DL (ref 65–99)
HDL CHOLESTEROL: 62 MG/DL
HEMATOCRIT: 40.5 % (ref 35–45)
HEMOGLOBIN A1C: 5.3 % OF TOTAL HGB
HEMOGLOBIN: 13.5 G/DL (ref 11.7–15.5)
LDL-CHOLESTEROL: 115 MG/DL (CALC)
LYMPHOCYTES: 42.6 %
MCH: 31.6 PG (ref 27–33)
MCHC: 33.3 G/DL (ref 32–36)
MCV: 94.8 FL (ref 80–100)
MONOCYTES: 9.6 %
MPV: 11.9 FL (ref 7.5–12.5)
NEUTROPHILS: 44 %
NON-HDL CHOLESTEROL: 145 MG/DL (CALC)
PLATELET COUNT: 225 THOUSAND/UL (ref 140–400)
POTASSIUM: 4.3 MMOL/L (ref 3.5–5.3)
PROTEIN, TOTAL: 7.3 G/DL (ref 6.1–8.1)
RDW: 12.2 % (ref 11–15)
RED BLOOD CELL COUNT: 4.27 MILLION/UL (ref 3.8–5.1)
SODIUM: 135 MMOL/L (ref 135–146)
TRIGLYCERIDES: 189 MG/DL
TSH W/REFLEX TO FT4: 2.23 MIU/L (ref 0.4–4.5)
WHITE BLOOD CELL COUNT: 4.8 THOUSAND/UL (ref 3.8–10.8)

## 2023-12-19 PROCEDURE — 99203 OFFICE O/P NEW LOW 30 MIN: CPT | Performed by: STUDENT IN AN ORGANIZED HEALTH CARE EDUCATION/TRAINING PROGRAM

## 2023-12-20 NOTE — PROGRESS NOTES
[facility]  Progress Note    Naomy Pascual is a 62year old female. Chief Complaint   Patient presents with    Bunions     Consult Left foot bunion, and pain 4/10 on middle of foot- onset 3 months ago - no injury. Has XR done. HPI:     Patient is a very pleasant 54-year-old female presents to clinic for evaluation of pain under her second metatarsal head of her left foot. She does have fairly severe bunion deformity to his foot but the bunion itself does not cause much pain. Most of her pain is under second metatarsal head. This has been going on for the last 3 months. Patient is wondering what treatment options are available. Past medical history, medications, and allergies reviewed. Allergies: Patient has no known allergies. Current Outpatient Medications   Medication Sig Dispense Refill    diclofenac 1 % External Gel Apply 2 g topically 4 (four) times daily. Buy otc Voltaren 150 g 0    letrozole 2.5 MG Oral Tab Take 1 tablet (2.5 mg total) by mouth daily. 90 tablet 1    Calcium Carbonate-Vitamin D (CALTRATE 600+D OR) Take by mouth. NITROFURANTOIN MACROCRYSTAL 50 MG Oral Cap TAKE 1 CAPSULE BY MOUTH EVERY DAY IN THE EVENING (Patient taking differently: Take 1 capsule (50 mg total) by mouth nightly as needed (patient only taking as needed). ) 30 capsule 1    Multiple Vitamin (MULTI-VITAMIN DAILY) Oral Tab Take 1 tablet by mouth daily. Acidophilus/Pectin Oral Cap Take 1 capsule by mouth daily.       venlafaxine ER 37.5 MG Oral Capsule SR 24 Hr Take 1 tablet daily 1 week then increase 2 po tablets daily 49 capsule 0      Past Medical History:   Diagnosis Date    ASCUS with positive high risk HPV cervical 06/09, 10/07    Breast cancer ([facility] [address]     Cancer [facility]) July 2022    Frequent UTI     reports frequent UTI's    H/O laparoscopy 2005    Human papilloma virus infection 06/23/2009    pap ASCUS    Human papilloma virus infection 10/09/2007    pap ASCUS    Human papilloma virus infection 2012    pap negative    Human papilloma virus infection 2008    pap LSIL    Left elbow tendonitis 2020    Low grade squamous intraepithelial lesion (LGSIL) at risk for high grade squamous intraepithelial lesion (HGSIL) on cytologic smear of cervix 2012, ,     Negative HPV +, LSIL HPV +, LSIL    Pap smear for cervical cancer screening , , , 07/10, 01/10,     Negative HPV Negative    PONV (postoperative nausea and vomiting)     Wears glasses had glasses since 5yrs old      Past Surgical History:   Procedure Laterality Date    APPENDECTOMY  2004    APPENDECTOMY  2004    Fely Garcia removed during a cyst removal surgery    COLONOSCOPY      COLONOSCOPY      benign findings    COLPOSCOPY, CERVIX W/UPPER ADJACENT VAGINA; W/BIOPSY(S), CERVIX  2007    LUMPECTOMY RIGHT        8437    no complications. NL preg. 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   Tobacco Use    Smoking status: Never    Smokeless tobacco: Never    Tobacco comments:     na   Vaping Use    Vaping Use: Never used   Substance and Sexual Activity    Alcohol use:  Yes     Alcohol/week: 2.0 standard drinks of alcohol     Types: 2 Glasses of wine per week     Comment: 3 glasses wine q weekend or less    Drug use: No    Sexual activity: Yes     Partners: Male   Other Topics Concern    Caffeine Concern Yes Exercise Yes    Seat Belt Yes    Special Diet No    Stress Concern No    Weight Concern No           REVIEW OF SYSTEMS:     Today reviewed systems as documented below  GENERAL HEALTH: feels well otherwise  SKIN: denies any unusual skin lesions or rashes  RESPIRATORY: denies shortness of breath with exertion  CARDIOVASCULAR: denies chest pain on exertion  GI: denies abdominal pain and denies heartburn  NEURO: denies headaches  MUSCULO: denies arthritis, back pain      EXAM:   LMP 08/01/2016 (Approximate)   GENERAL: well developed, well nourished, in no apparent distress  EXTREMITIES:   1. Integument: Normal skin temperature and turgor  2. Vascular: Dorsalis pedis two out of four bilateral and posterior tibial pulses two out of   four bilateral, capillary refill normal.   3. Musculoskeletal: All muscle groups are graded 5 out of 5 in the foot and ankle. Pain under second metatarsal of the left foot. Moderate bunion deformity left foot. No pain noted to bunion. 4. Neurological: Normal sharp dull sensation; reflexes normal.        ASSESSMENT AND PLAN:   Diagnoses and all orders for this visit:    Mc Baez, left foot    Hallux valgus, left    Left foot pain    Polyarthralgia    Other orders  -     [facility] AMB POD XR - LT FOOT 3 VIEWS(AP,LAT,OBLIQUE)        Plan:    -Patient examined, chart history reviewed. -Discussed etiology of condition and various treatment options. -X-rays obtained and reviewed. Moderate bunion deformities and degenerative changes with. Mildly elongated second metatarsal.  -Discussed with patient that her bunion deformity results in extra pressure on her second metatarsal head of her left foot.  -Discussed treatment options including conservative and surgical treatment options.  -Conservatively, metatarsal pads were dispensed to patient. She will try these in supportive tennis shoes and see if this helps with her metatarsalgia symptoms.   If improvement may consider building this into custom insert.  -If no improvement could consider bunion corrective surgery and shortening osteotomy to second metatarsal.  -RTC 1 month. The patient indicates understanding of these issues and agrees to the plan. Maryana Plasencia DPM    Dragon speech recognition software was used to prepare this note. Errors in word recognition may occur. Please contact me with any questions/concerns with this note.

## 2023-12-26 ENCOUNTER — APPOINTMENT (OUTPATIENT)
Dept: HEMATOLOGY/ONCOLOGY | Facility: HOSPITAL | Age: 58
End: 2023-12-26
Attending: INTERNAL MEDICINE
Payer: COMMERCIAL

## 2023-12-27 ENCOUNTER — OFFICE VISIT (OUTPATIENT)
Dept: HEMATOLOGY/ONCOLOGY | Facility: HOSPITAL | Age: 58
End: 2023-12-27
Attending: INTERNAL MEDICINE
Payer: COMMERCIAL

## 2023-12-27 VITALS
WEIGHT: 122.5 LBS | SYSTOLIC BLOOD PRESSURE: 161 MMHG | HEART RATE: 71 BPM | TEMPERATURE: 99 F | DIASTOLIC BLOOD PRESSURE: 98 MMHG | OXYGEN SATURATION: 100 % | RESPIRATION RATE: 18 BRPM | BODY MASS INDEX: 20 KG/M2

## 2023-12-27 DIAGNOSIS — T50.905D ADVERSE EFFECT OF DRUG, SUBSEQUENT ENCOUNTER: ICD-10-CM

## 2023-12-27 DIAGNOSIS — D05.11 INTRADUCTAL CARCINOMA IN SITU OF RIGHT BREAST: Primary | ICD-10-CM

## 2023-12-27 DIAGNOSIS — M89.9 BONE DISORDER: ICD-10-CM

## 2023-12-27 PROCEDURE — 99214 OFFICE O/P EST MOD 30 MIN: CPT | Performed by: INTERNAL MEDICINE

## 2023-12-27 RX ORDER — LETROZOLE 2.5 MG/1
2.5 TABLET, FILM COATED ORAL DAILY
Qty: 90 TABLET | Refills: 3 | Status: SHIPPED | OUTPATIENT
Start: 2023-12-27

## 2023-12-27 NOTE — PROGRESS NOTES
Education Record    Learner:  Patient    Disease / Diagnosis: right breast DCIS     Barriers / Limitations:  None   Comments:    Method:  Discussion   Comments:    General Topics:  Plan of care reviewed   Comments:    Outcome:  Shows understanding   Comments:   Breast imaging up to date. Taking letrozole. Tolerating well. Order in the system. Reviewed timing of FU with surgeon and Dr Mariane Peabody.

## 2023-12-29 ENCOUNTER — APPOINTMENT (OUTPATIENT)
Dept: HEMATOLOGY/ONCOLOGY | Facility: HOSPITAL | Age: 58
End: 2023-12-29
Attending: INTERNAL MEDICINE
Payer: COMMERCIAL

## 2023-12-30 ENCOUNTER — HOSPITAL ENCOUNTER (OUTPATIENT)
Dept: BONE DENSITY | Age: 58
Discharge: HOME | End: 2023-12-30
Attending: FAMILY MEDICINE
Payer: COMMERCIAL

## 2023-12-30 DIAGNOSIS — M85.859 OSTEOPENIA OF NECK OF FEMUR, UNSPECIFIED LATERALITY: ICD-10-CM

## 2023-12-30 PROCEDURE — 77080 DXA BONE DENSITY AXIAL: CPT | Performed by: FAMILY MEDICINE

## 2024-01-02 NOTE — PROGRESS NOTES
Results reviewed. Notified by Avelina Medellin,  DEXA scan indicates osteopenia. Treatment of osteopenia includes taking vitamin D3 1000 international units or 25 mcg daily, 600 mg of calcium carbonate or Os-Tez twice daily and weightbearing exercises 30 minutes 3 times a week. DEXA scan should be repeated to monitor every 2 years. Please let me know if you have any questions.   Sincerely,  Dr. Rebekah Shah

## 2024-01-09 ENCOUNTER — MED REC SCAN ONLY (OUTPATIENT)
Dept: FAMILY MEDICINE CLINIC | Facility: CLINIC | Age: 59
End: 2024-01-09

## 2024-01-11 ENCOUNTER — TELEPHONE (OUTPATIENT)
Dept: FAMILY MEDICINE CLINIC | Facility: CLINIC | Age: 59
End: 2024-01-11

## 2024-01-11 NOTE — TELEPHONE ENCOUNTER
Please call patient-reviewed work physical labs collected on 11/4/2023-cholesterol was mildly elevated recommend following Mediterranean diet, regular exercise 150 to 300 minutes weekly and repeat cholesterol in 1 year to monitor.    Only other abnormality was rheumatoid factor was elevated at 328.6 mg/L.  C-reactive protein was less than 1 which is normal and reassuring.  This is antibodies that are specific for rheumatoid arthritis.  Patient needs to keep her appointment on 2/6/2024 with Dr. Oneal for further evaluation.    Otherwise labs were normal.

## 2024-01-12 NOTE — TELEPHONE ENCOUNTER
Called pt and reviewed below recommendations. She will keep appt with Dr Oneal. She verbalized understanding and agrees with plan.

## 2024-02-06 ENCOUNTER — OFFICE VISIT (OUTPATIENT)
Dept: RHEUMATOLOGY | Facility: CLINIC | Age: 59
End: 2024-02-06
Payer: COMMERCIAL

## 2024-02-06 VITALS
TEMPERATURE: 98 F | SYSTOLIC BLOOD PRESSURE: 134 MMHG | RESPIRATION RATE: 16 BRPM | WEIGHT: 119.13 LBS | HEART RATE: 87 BPM | OXYGEN SATURATION: 97 % | BODY MASS INDEX: 19.85 KG/M2 | HEIGHT: 65 IN | DIASTOLIC BLOOD PRESSURE: 82 MMHG

## 2024-02-06 DIAGNOSIS — Z11.4 SCREENING FOR HIV (HUMAN IMMUNODEFICIENCY VIRUS): ICD-10-CM

## 2024-02-06 DIAGNOSIS — Z11.1 SCREENING FOR TUBERCULOSIS: ICD-10-CM

## 2024-02-06 DIAGNOSIS — R76.8 RHEUMATOID FACTOR POSITIVE: Primary | ICD-10-CM

## 2024-02-06 DIAGNOSIS — Z11.59 NEED FOR HEPATITIS C SCREENING TEST: ICD-10-CM

## 2024-02-06 DIAGNOSIS — M85.852 OSTEOPENIA OF NECK OF LEFT FEMUR: ICD-10-CM

## 2024-02-06 DIAGNOSIS — Z51.81 THERAPEUTIC DRUG MONITORING: ICD-10-CM

## 2024-02-06 DIAGNOSIS — Z11.59 NEED FOR HEPATITIS B SCREENING TEST: ICD-10-CM

## 2024-02-06 PROCEDURE — 99244 OFF/OP CNSLTJ NEW/EST MOD 40: CPT | Performed by: INTERNAL MEDICINE

## 2024-02-06 PROCEDURE — 3075F SYST BP GE 130 - 139MM HG: CPT | Performed by: INTERNAL MEDICINE

## 2024-02-06 PROCEDURE — 3008F BODY MASS INDEX DOCD: CPT | Performed by: INTERNAL MEDICINE

## 2024-02-06 PROCEDURE — 3079F DIAST BP 80-89 MM HG: CPT | Performed by: INTERNAL MEDICINE

## 2024-02-06 NOTE — PROGRESS NOTES
Rheumatology New Patient Note  =====================================================================================================    Date of visit: 2/6/2024  ?  Chief complaint: +RF    Chief Complaint   Patient presents with    New Patient     New patient referred by her primary for polyarthralgia. She has stiffness in her hands and feet especially in the morning. At times it is difficult to go up/down the stairs.      PCP  Omaira Mon DO  Fax: [phone]  Phone: [phone]  =====================================================================================================  HPI    Rose Head is a 58 year old female     Here for evaluation of a positive rheumatoid factor.  Stiffness and pain for several months. +RF noted at employment screening. Doing well today.  Right thumb stiffness is the main issue.  Sometimes it locks on her.  Works out using dumbbells.  Also golfs.    Sept 2022: taking letrozole for breast cancer.  No significant arthralgia for the first several months of taking the medication.    Left foot pain.  Saw podiatry.  Tender palpation in the MTPs.  Recommended foot pads.  Partial improvement.  Seeing them again next week.    Osteopenia: No fracture in the past.  Taking 1000 mg daily of calcium  -works in white collar job.     14 point ROS negative except noted above    Medications:  Current Outpatient Medications on File Prior to Visit   Medication Sig Dispense Refill    letrozole 2.5 MG Oral Tab Take 1 tablet (2.5 mg total) by mouth daily. 90 tablet 3    diclofenac 1 % External Gel Apply 2 g topically 4 (four) times daily. Buy otc Voltaren 150 g 0    Calcium Carbonate-Vitamin D (CALTRATE 600+D OR) Take by mouth.      Multiple Vitamin (MULTI-VITAMIN DAILY) Oral Tab Take 1 tablet by mouth daily.      Acidophilus/Pectin Oral Cap Take 1 capsule by mouth daily.       Current Facility-Administered Medications on File Prior to Visit   Medication Dose Route Frequency Provider Last Rate  Last Admin    triamcinolone acetonide (Kenalog-40) 40 MG/ML injection 40 mg  40 mg Intra-articular Once Cole Conti MD           Past Medical History:  Past Medical History:   Diagnosis Date    ASCUS with positive high risk HPV cervical , 10/07    Breast cancer (HCC)     Cancer (HCC) 2022    Frequent UTI     reports frequent UTI's    H/O laparoscopy     Human papilloma virus infection 2009    pap ASCUS    Human papilloma virus infection 10/09/2007    pap ASCUS    Human papilloma virus infection 2012    pap negative    Human papilloma virus infection 2008    pap LSIL    Left elbow tendonitis 2020    Low grade squamous intraepithelial lesion (LGSIL) at risk for high grade squamous intraepithelial lesion (HGSIL) on cytologic smear of cervix 2012, ,     Negative HPV +, LSIL HPV +, LSIL    Pap smear for cervical cancer screening , , , 07/10, 01/10,     Negative HPV Negative    PONV (postoperative nausea and vomiting)     Wears glasses had glasses since 5yrs old     Past Surgical History:  Past Surgical History:   Procedure Laterality Date    APPENDECTOMY      APPENDECTOMY  2004    Dr.Karen Spaulding removed during a cyst removal surgery    COLONOSCOPY      COLONOSCOPY      benign findings    COLPOSCOPY, CERVIX W/UPPER ADJACENT VAGINA; W/BIOPSY(S), CERVIX  2007    LUMPECTOMY RIGHT            no complications. NL preg. FT     OTHER SURGICAL HISTORY      uterine cyst removed    REDUCTION RIGHT      SKIN SURGERY  May 2023     Family History:  Family History   Problem Relation Age of Onset    LCIS Self 56    Breast Cancer Mother 56    High Blood Pressure Mother     Heart Disease Father         unknown    Diabetes Father         unknown    No Known Problems Son     Other (Other) Maternal Grandmother         osteoporosis    Heart Disease Paternal Grandfather         unknown     Social History:  Social History      Socioeconomic History    Marital status:      Spouse name: Ranjan    Number of children: 1   Occupational History    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   Tobacco Use    Smoking status: Never    Smokeless tobacco: Never    Tobacco comments:     na   Vaping Use    Vaping Use: Never used   Substance and Sexual Activity    Alcohol use: Yes     Alcohol/week: 2.0 standard drinks of alcohol     Types: 2 Glasses of wine per week     Comment: 3 glasses wine q weekend or less    Drug use: No    Sexual activity: Yes     Partners: Male   Other Topics Concern    Caffeine Concern Yes    Exercise Yes    Seat Belt Yes    Special Diet No    Stress Concern No    Weight Concern No   Social History Narrative    , lives alone    Has 1 son, [place] the city     ?  Allergies:  No Known Allergies      Objective    Vitals:    02/06/24 1457 02/06/24 1500   BP: 134/82    Pulse:  87   Resp:  16   Temp: 98.1 °F (36.7 °C)    SpO2:  97%   Weight: 119 lb 1.9 oz (54 kg)    Height: 5' 5\" (1.651 m)        GEN: NAD, well-nourished.   HEENT: Head: NCAT. Face: No lesions. Eyes: Conjunctiva clear. Sclera are anicteric. PERRLA. EOMs are full. Ears: The right and left ear canals are clear.  Nose: No external or internal nasal deformities. Nasal septum is midline. Mouth: The lips are within normal limits.  No oral ulcers Tongue is midline with no lesions. The oral cavity is clear.   Neck: Supple. No neck masses. No thyromegaly. No LAD, parotid or submandicular gland palpated.   CV: RRR, no mrg, S1/S2  PULM: CTAB, no wrr, easy effort  Abd: s/nt/nd  Extremities: No cyanosis, edema or deformities.   Neurologic: Strength, CN2-12 grossly intact   Psych: normal affect.   Skin: No lesions or rashes.  MSK: 28 joint count performed. No evidence of synovitis in mcp, pip, dip, wrist, elbows, shoulders, hips, knees, ankles, mtp unless otherwise noted. Full ROM of elbows, wrists, knees.     No peripheral joint synovitis.  -Hypertrophy of the right  MCP 1 with volar nodule at the level of the A1 pulley.  No reproducible triggering  -Left valgus valgus with significant MTP 1 hypertrophy    Labs:    12/2023  CBC W differential WNL  Creatinine 0.74, rest of CMP WNL  TSH WNL  BHARATHI negative    CCP is negative  CRP is 38.4 mg/liter  Sed rate 6  Vitamin D40    Lab Results   Component Value Date    WBC 4.8 12/18/2023    RBC 4.27 12/18/2023    HGB 13.5 12/18/2023    HCT 40.5 12/18/2023     12/18/2023    MCV 94.8 12/18/2023    MCH 31.6 12/18/2023    MCHC 33.3 12/18/2023    RDW 12.2 12/18/2023    NEPRELIM 4.52 12/08/2023    NEPERCENT 44 12/18/2023    LYPERCENT 42.6 12/18/2023    MOPERCENT 9.6 12/18/2023    EOPERCENT 2.1 12/18/2023    BAPERCENT 1.7 12/18/2023    NE 2,112 12/18/2023    LYMABS 2,045 12/18/2023    MOABSO 461 12/18/2023    EOABSO 101 12/18/2023    BAABSO 82 12/18/2023     Lab Results   Component Value Date    GLU 94 12/18/2023    BUN 19 12/18/2023    BUNCREA SEE NOTE: 12/18/2023    CREATSERUM 0.74 12/18/2023    ANIONGAP 4 12/08/2023    GFR 80 07/07/2017    CA 10.1 12/18/2023    OSMOCALC 287 12/08/2023    ALKPHO 97 12/18/2023    AST 21 12/18/2023    ALT 16 12/18/2023    BILT 0.6 12/18/2023    TP 7.3 12/18/2023    ALB 4.6 12/18/2023    GLOBULIN 2.7 12/18/2023    AGRATIO 1.7 12/18/2023     12/18/2023    K 4.3 12/18/2023    CL 97 (L) 12/18/2023    CO2 31 12/18/2023         Lab Results   Component Value Date    BHARATHI NEGATIVE 11/10/2023     No results found for: \"SSA\", \"SSAUR\", \"ANTISSA\", \"SSA52\", \"SSA60\", \"SSADD\", \"SSB\", \"ANTISSB\"  No results found for: \"DSDNA\", \"ANTIDSDNA\", \"SMUD\", \"ANTISM\", \"SM\", \"RNP\", \"ANTIRNP\", \"SMITHRNP\"  No results found for: \"SCL70\", \"SCL\", \"SLRKEYR27\"  No results found for: \"C3\", \"C4\"  No results found for: \"DRVVT\", \"LAINT\", \"PTTLUPUS\", \"LUPUSINTERP\", \"LA\", \"G8MJ3TPSIR\", \"D9JH8BFPKX\", \"V9NSLTPGWZ\", \"B9UTGHOJQV\"  No results found for: \"CARDIOLIPIGG\", \"CARDIOLIPIGM\", \"CARDIOLIPIGA\", \"CARDIOIGA\", \"CARLIP\"      Additional  Labs:    Radiology:    XR DEXA BONE DENSITOMETRY (CPT=77080)    Result Date: 12/30/2023  CONCLUSION:  Bone mineral density values for the left femoral neck are compatible with the diagnosis of osteopenia by WHO definition (T score between -1.0 and -2.5).  If therapy is initiated, follow-up study is recommended in 2 years for further evaluation of therapeutic efficacy.  There has been a statistically significant decrease in the bone mineral density values for the lumbar spine, left femoral neck and total left hip compared to the exam from 12/9/2021.     The World Health Organization has defined the following categories based on bone density: Normal bone:  T-score better than -1 Osteopenia: T-score between -1 and -2.5 Osteoporosis:  T-score less than -2.5   LOCATION:  [facility]     Dictated by (CST): Emigdio Ascencio MD on 12/30/2023 at 11:44 AM     Finalized by (CST): Emigdio Ascencio MD on 12/30/2023 at 11:45 AM        Radiology review:      =====================================================================================================  Assessment and Plan    Assessment:  1. Rheumatoid factor positive    2. Need for hepatitis B screening test    3. Screening for tuberculosis    4. Therapeutic drug monitoring    5. Osteopenia of neck of left femur    6. Need for hepatitis C screening test    7. Screening for HIV (human immunodeficiency virus)      Positive rheumatoid factor without evidence of overt inflammatory synovitis.  Patient notes tenderness along the left MTPs during prior podiatry visit that is not currently present.  Certainly, she does not have rheumatoid arthritis given no synovitis today, but seropositive palindromic rheumatism (this can be thought of as episodic RA) remains on the differential.      Also need to check for other causes of high titer rheumatoid factor. Positive rheumatoid factor can be seen in rheumatoid arthritis, primary Sjogren's syndrome, connective tissue disease including  mixed connective tissue disorder, SLE, myositis.  Can also be seen in infections including viral hepatitides, lymphoproliferative diseases, cryoglobulinemia, and sarcoidosis.     #Osteopenia: On aromatase inhibitor.  No fractures in the past    Plan:  --If not yet completed. Osteoporosis and/or osteopenia work-up: CMP, CBC, vitamin D, PTH, TSH, mag, Phos.  --For positive rheumatoid factor: send Hep B/C/IGRA/HIV, SPEP, CXR   -For right trigger thumb: Use workout gloves while lifting dumbbells.  Use gloves while golfing  -RTC in 6 months depending on the above workup.    Diagnoses and all orders for this visit:    Rheumatoid factor positive  -     HCV Antibody  -     Hep B Core AB, Tot  -     Hepatitis B Surface Antibody  -     Hepatitis B Surface Antigen  -     Quantiferon TB Plus  -     PTH, Intact  -     Phosphorus  -     Magnesium  -     Vitamin D; Future  -     Protein Electrophoresis, with Total Protein and Reflex to ANNY, Serum [[id number]] [Q]  -     XR CHEST PA + LAT CHEST (CPT=71046); Future  -     HIV Ag/Ab Combo    Need for hepatitis B screening test  -     Hep B Core AB, Tot  -     Hepatitis B Surface Antibody  -     Hepatitis B Surface Antigen    Screening for tuberculosis  -     Quantiferon TB Plus    Therapeutic drug monitoring  -     HCV Antibody  -     Hep B Core AB, Tot  -     Hepatitis B Surface Antibody  -     Hepatitis B Surface Antigen  -     Quantiferon TB Plus  -     PTH, Intact  -     Phosphorus  -     Magnesium  -     Vitamin D; Future  -     Protein Electrophoresis, with Total Protein and Reflex to ANNY, Serum [[id number]] [Q]  -     XR CHEST PA + LAT CHEST (CPT=71046); Future  -     HIV Ag/Ab Combo    Osteopenia of neck of left femur  -     HCV Antibody  -     Hep B Core AB, Tot  -     Hepatitis B Surface Antibody  -     Hepatitis B Surface Antigen  -     Quantiferon TB Plus  -     PTH, Intact  -     Phosphorus  -     Magnesium  -     Vitamin D; Future  -     Protein Electrophoresis, with Total  Protein and Reflex to ANNY, Serum [[id number]] [Q]    Need for hepatitis C screening test  -     HCV Antibody    Screening for HIV (human immunodeficiency virus)  -     HCV Antibody  -     Hep B Core AB, Tot  -     Hepatitis B Surface Antibody  -     Hepatitis B Surface Antigen  -     Quantiferon TB Plus  -     PTH, Intact  -     Phosphorus  -     Magnesium  -     Vitamin D; Future  -     Protein Electrophoresis, with Total Protein and Reflex to ANNY, Serum [[id number]] [Q]  -     XR CHEST PA + LAT CHEST (CPT=71046); Future  -     HIV Ag/Ab Combo        No follow-ups on file.      The above plan of care, diagnosis, orders, and follow-up were discussed with the patient. Questions related to this recommended plan of care were answered.    Thank you for referring this delightful patient to me. Please feel free to contact me with any questions.     This report was performed utilizing speech recognition software technology. Despite proofreading, speech recognition errors could escape detection. If a word or phrase is confusing or out of context, please do not hesitate to call for   clarification.       Kind regards      Max Oneal MD  [facility]

## 2024-02-14 ENCOUNTER — OFFICE VISIT (OUTPATIENT)
Dept: PODIATRY CLINIC | Facility: CLINIC | Age: 59
End: 2024-02-14
Payer: COMMERCIAL

## 2024-02-14 DIAGNOSIS — M77.42 METATARSALGIA, LEFT FOOT: Primary | ICD-10-CM

## 2024-02-14 DIAGNOSIS — M20.12 HALLUX VALGUS, LEFT: ICD-10-CM

## 2024-02-18 NOTE — PROGRESS NOTES
[facility] Podiatry  Progress Note    Rose Head is a 58 year old female.   Chief Complaint   Patient presents with    Follow - Up     Left foot follow up-denies any pain         HPI:     Patient is a very pleasant 58-year-old female presents to clinic for evaluation of pain under her second metatarsal head of her left foot.  She relates that she tried metatarsal pads a little bit and also just ambulate with a variety of shoes.  She relates that her pain resolved on its own since last visit.  She does not feel the need for orthotics or further intervention at this time as she is doing well.  No other complaints are mentioned.  Past medical history, medications, and allergies reviewed.      Allergies: Patient has no known allergies.   Current Outpatient Medications   Medication Sig Dispense Refill    letrozole 2.5 MG Oral Tab Take 1 tablet (2.5 mg total) by mouth daily. 90 tablet 3    diclofenac 1 % External Gel Apply 2 g topically 4 (four) times daily. Buy otc Voltaren 150 g 0    Calcium Carbonate-Vitamin D (CALTRATE 600+D OR) Take by mouth.      Multiple Vitamin (MULTI-VITAMIN DAILY) Oral Tab Take 1 tablet by mouth daily.      Acidophilus/Pectin Oral Cap Take 1 capsule by mouth daily.        Past Medical History:   Diagnosis Date    ASCUS with positive high risk HPV cervical 06/09, 10/07    Breast cancer (HCC)     Cancer (HCC) July 2022    Frequent UTI     reports frequent UTI's    H/O laparoscopy 2005    Human papilloma virus infection 06/23/2009    pap ASCUS    Human papilloma virus infection 10/09/2007    pap ASCUS    Human papilloma virus infection 09/19/2012    pap negative    Human papilloma virus infection 12/03/2008    pap LSIL    Left elbow tendonitis 07/23/2020    Low grade squamous intraepithelial lesion (LGSIL) at risk for high grade squamous intraepithelial lesion (HGSIL) on cytologic smear of cervix 09/2012, 12/08, 03/07    Negative HPV +, LSIL HPV +, LSIL    Pap smear for cervical cancer  screening , , , 07/10, 01/10,     Negative HPV Negative    PONV (postoperative nausea and vomiting)     Wears glasses had glasses since 5yrs old      Past Surgical History:   Procedure Laterality Date    APPENDECTOMY      APPENDECTOMY  2004    Dr.Karen Spaulding removed during a cyst removal surgery    COLONOSCOPY      COLONOSCOPY      benign findings    COLPOSCOPY, CERVIX W/UPPER ADJACENT VAGINA; W/BIOPSY(S), CERVIX  2007    LUMPECTOMY RIGHT            no complications. NL preg. 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   Tobacco Use    Smoking status: Never    Smokeless tobacco: Never    Tobacco comments:     na   Vaping Use    Vaping Use: Never used   Substance and Sexual Activity    Alcohol use: Yes     Alcohol/week: 2.0 standard drinks of alcohol     Types: 2 Glasses of wine per week     Comment: 3 glasses wine q weekend or less    Drug use: No    Sexual activity: Yes     Partners: Male   Other Topics Concern    Caffeine Concern Yes    Exercise Yes    Seat Belt Yes    Special Diet No    Stress Concern No    Weight Concern No           REVIEW OF SYSTEMS:     Today reviewed systems as documented below  GENERAL HEALTH: feels well otherwise  SKIN: denies any unusual skin lesions or rashes  RESPIRATORY: denies shortness of breath with exertion  CARDIOVASCULAR: denies chest pain on exertion  GI:  denies abdominal pain and denies heartburn  NEURO: denies headaches  MUSCULO: denies arthritis, back pain      EXAM:   LMP 08/01/2016 (Approximate)   GENERAL: well developed, well nourished, in no apparent distress  EXTREMITIES:   Exam deferred.        ASSESSMENT AND PLAN:   Diagnoses and all orders for this visit:    Metatarsalgia, left foot    Hallux valgus, left        Plan:    -Patient examined, chart history reviewed.  -Discussed etiology of condition and various treatment options.  -Prior x-rays do show elongated second metatarsal.    Patient did try metatarsal pads and has been ambulating the right shoes.  She does relate that her symptoms resolved since last visit.  -Patient does not feel need to pursue any further treatment option at this time.  Did discuss that we can plan to casted for custom inserts or consider surgical intervention if symptoms recur in the future.  She will be predisposed to issues in this region due to her long second metatarsal.  She should try to ambulate with supportive shoes and inserts to avoid recurrent metatarsalgia.  -Appreciate the opportunity to participate in patient's care.  Can follow-up as needed.      The patient indicates understanding of these issues and agrees to the plan.        Kirk Medeiros DPM    Dragon speech recognition software was used to prepare this note.  Errors in word recognition may occur.  Please contact me with any questions/concerns with this note.

## 2024-02-19 ENCOUNTER — HOSPITAL ENCOUNTER (OUTPATIENT)
Dept: MAMMOGRAPHY | Facility: HOSPITAL | Age: 59
Discharge: HOME | End: 2024-02-19
Attending: SURGERY
Payer: COMMERCIAL

## 2024-02-19 DIAGNOSIS — D05.11 DUCTAL CARCINOMA IN SITU (DCIS) OF RIGHT BREAST: ICD-10-CM

## 2024-02-19 DIAGNOSIS — D05.01 LOBULAR CARCINOMA IN SITU (LCIS) OF RIGHT BREAST: ICD-10-CM

## 2024-02-19 PROCEDURE — 77062 BREAST TOMOSYNTHESIS BI: CPT | Performed by: SURGERY

## 2024-02-19 PROCEDURE — 77066 DX MAMMO INCL CAD BI: CPT | Performed by: SURGERY

## 2024-03-05 ENCOUNTER — OFFICE VISIT (OUTPATIENT)
Dept: OBGYN CLINIC | Facility: CLINIC | Age: 59
End: 2024-03-05
Payer: COMMERCIAL

## 2024-03-05 VITALS
BODY MASS INDEX: 20 KG/M2 | DIASTOLIC BLOOD PRESSURE: 90 MMHG | SYSTOLIC BLOOD PRESSURE: 130 MMHG | HEART RATE: 93 BPM | WEIGHT: 119.38 LBS

## 2024-03-05 DIAGNOSIS — Z01.419 ENCOUNTER FOR WELL WOMAN EXAM WITH ROUTINE GYNECOLOGICAL EXAM: Primary | ICD-10-CM

## 2024-03-05 PROCEDURE — 3080F DIAST BP >= 90 MM HG: CPT | Performed by: OBSTETRICS & GYNECOLOGY

## 2024-03-05 PROCEDURE — 3075F SYST BP GE 130 - 139MM HG: CPT | Performed by: OBSTETRICS & GYNECOLOGY

## 2024-03-05 PROCEDURE — 99396 PREV VISIT EST AGE 40-64: CPT | Performed by: OBSTETRICS & GYNECOLOGY

## 2024-03-05 NOTE — PROGRESS NOTES
[facility]  Obstetrics and Gynecology    Subjective:     Rose Head is a 58 year old  who presents for an annual gyn exam. Patient complaints include - none.    Patient's last menstrual period was 2016 (approximate).   Menarche: 13 (3/5/2024  2:35 PM)  Period Cycle (Days): menopause (3/5/2024  2:35 PM)  Period Duration (Days): na (3/5/2024  2:35 PM)  Period Flow: na (3/5/2024  2:35 PM)  Use of Birth Control (if yes, specify type): Postmenopausal (3/5/2024  2:35 PM)  Hx Prior Abnormal Pap: No (3/5/2024  2:35 PM)  Pap Date: 22 (3/5/2024  2:35 PM)  Pap Result Notes: wnl (3/5/2024  2:35 PM)     Dyspareunia: No.    Difficulty with bowel or bladder function: No.   History of STDs: No.  Smoker: No.  Safe at home: Yes.  , adult son, works in manufacturing.    Screening:  Pap smear: neg   Mammogram: 2024 - BI-RADS 2  Colonoscopy: 2018   DEXA: n/a No recommendations at this time     Review of Systems  Constitutional: Denies fever/chills, weight loss, fatigue, weakness, or sweating  HEENT: Denies headache, hearing loss or tinnitus, ear pain or discharge, nosebleeds, congestion, sore throat  Eye: Denies visual changes, eye pain or discharge or redness  Cardiovascular: Denies chest pain, palpitations  Pulmonary: Denies cough, shortness of breath, wheezing  Breast: denies breast pain or palpable mass, skin changes, nipple discharge  GI: Denies nausea, vomiting, diarrhea, constipation, heartburn, hemachezia  : Denies dysuria, urgency, frequency, hematuria, flank pain  Musculoskeletal: Denies myalgias, pain in neck or back or joints  Skin: Denies rash, itching  Endocrine: Denies easy bruising or bleeding, increased thirst  Neuro: Denies dizziness, paraesthesias, focal weakness, seizures, loss of consciousness  Psych: Denies depression, anxiety, suicidal ideations, hallucinations, insomnia     Past Medical History   Past Medical History:   Diagnosis Date    ASCUS with  positive high risk HPV cervical , 10/07    Breast cancer (HCC)     Cancer (HCC) 2022    Frequent UTI     reports frequent UTI's    H/O laparoscopy     Human papilloma virus infection 2009    pap ASCUS    Human papilloma virus infection 10/09/2007    pap ASCUS    Human papilloma virus infection 2012    pap negative    Human papilloma virus infection 2008    pap LSIL    Left elbow tendonitis 2020    Low grade squamous intraepithelial lesion (LGSIL) at risk for high grade squamous intraepithelial lesion (HGSIL) on cytologic smear of cervix 2012, ,     Negative HPV +, LSIL HPV +, LSIL    Pap smear for cervical cancer screening , , , 07/10, 01/10,     Negative HPV Negative    PONV (postoperative nausea and vomiting)     Wears glasses had glasses since 5yrs old         Past Obstetric History   OB History    Para Term  AB Living   1 1 1     1   SAB IAB Ectopic Multiple Live Births                  # Outcome Date GA Lbr Mike/2nd Weight Sex Delivery Anes PTL Lv   1 Term 89    M NORMAL SPONT         Obstetric Comments   Menarche: 14 y/o   Menopause: 2016   OCP use x 30 yrs, stopped recently   Breasfeed: NO   BRA SIZE: 34B       Past Surgical History   Past Surgical History:   Procedure Laterality Date    APPENDECTOMY      APPENDECTOMY  2004    Dr.Karen Spaulding removed during a cyst removal surgery    COLONOSCOPY      COLONOSCOPY      benign findings    COLPOSCOPY, CERVIX W/UPPER ADJACENT VAGINA; W/BIOPSY(S), CERVIX  2007    LUMPECTOMY RIGHT            no complications. NL preg. FT     OTHER SURGICAL HISTORY      uterine cyst removed    REDUCTION RIGHT      SKIN SURGERY  May 2023        Family History   Family History   Problem Relation Age of Onset    LCIS Self 56    Breast Cancer Mother 56    High Blood Pressure Mother     Heart Disease Father         unknown    Diabetes Father         unknown    No Known  Problems Son     Other (Other) Maternal Grandmother         osteoporosis    Heart Disease Paternal Grandfather         unknown        Social History   Social History     Socioeconomic History    Marital status:      Spouse name: Ranjan    Number of children: 1   Occupational History    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   Tobacco Use    Smoking status: Never    Smokeless tobacco: Never    Tobacco comments:     na   Vaping Use    Vaping Use: Never used   Substance and Sexual Activity    Alcohol use: Yes     Alcohol/week: 2.0 standard drinks of alcohol     Types: 2 Glasses of wine per week     Comment: 3 glasses wine q weekend or less    Drug use: No    Sexual activity: Yes     Partners: Male   Other Topics Concern    Caffeine Concern Yes    Exercise Yes    Seat Belt Yes    Special Diet No    Stress Concern No    Weight Concern No   Social History Narrative    , lives alone    Has 1 son, [place] the city        Medications   Current Outpatient Medications on File Prior to Visit   Medication Sig Dispense Refill    letrozole 2.5 MG Oral Tab Take 1 tablet (2.5 mg total) by mouth daily. 90 tablet 3    diclofenac 1 % External Gel Apply 2 g topically 4 (four) times daily. Buy otc Voltaren 150 g 0    Calcium Carbonate-Vitamin D (CALTRATE 600+D OR) Take by mouth.      Multiple Vitamin (MULTI-VITAMIN DAILY) Oral Tab Take 1 tablet by mouth daily.      Acidophilus/Pectin Oral Cap Take 1 capsule by mouth daily.       Current Facility-Administered Medications on File Prior to Visit   Medication Dose Route Frequency Provider Last Rate Last Admin    triamcinolone acetonide (Kenalog-40) 40 MG/ML injection 40 mg  40 mg Intra-articular Once Cole Conti MD           Allergies   No Known Allergies       Objective:     Vitals:    03/05/24 1432   BP: 130/90   Pulse: 93   Weight: 119 lb 6 oz (54.1 kg)       Body mass index is 19.87 kg/m².    GEN: AAOx3, NAD, appears well, appears stated age  HEENT: normocephalic,  atraumatic, thyroid symmetric without enlargement or nodularity  BREAST: bilaterally symmetric with no palpable masses, no nipple discharge, no skin changes. Well healed scar from lumpectomy.   CV: RRR  PULM: CTAB  ABD: soft, NT, ND, no rebound or guarding  EXT: no c/c/e or tenderness  NEURO: CN 2-12 grossly intact  PELVIC:   Vulva: NEFG.   Vagina: Estrogenized, physiologic discharge.    Cervix: No lesions or tenderness.     Uterus: anteverted, 6 week size, firm, mobile, nontender.     Adnexa: No masses or tenderness.     Rectal: Anus and perineum are normal.      Assessment:     Rose Head is a 58 year old  seen for well-women gyn exam.    Plan:     -- cervical cancer screening: up to date with pap smear   -- breast cancer screening: continue annual CBE and mammograms  -- STD screening ordered: No  -- Contraception: n/a  -- Discussed further preventative care with PCP, staying up to date with screening and vaccinations, and maintaining healthy diet and exercise.      -- Follow up in 1 year for annual exam or sooner as needed    Ashley Davies MD  [facility]  3/5/2024 2:40 PM

## 2024-04-05 ENCOUNTER — OFFICE VISIT (OUTPATIENT)
Facility: LOCATION | Age: 59
End: 2024-04-05
Payer: COMMERCIAL

## 2024-04-05 ENCOUNTER — HOSPITAL ENCOUNTER (OUTPATIENT)
Dept: GENERAL RADIOLOGY | Age: 59
Discharge: HOME | End: 2024-04-05
Attending: INTERNAL MEDICINE
Payer: COMMERCIAL

## 2024-04-05 VITALS — TEMPERATURE: 98 F | HEART RATE: 86 BPM

## 2024-04-05 DIAGNOSIS — D05.01 LOBULAR CARCINOMA IN SITU (LCIS) OF RIGHT BREAST: ICD-10-CM

## 2024-04-05 DIAGNOSIS — Z51.81 THERAPEUTIC DRUG MONITORING: ICD-10-CM

## 2024-04-05 DIAGNOSIS — D05.11 DUCTAL CARCINOMA IN SITU (DCIS) OF RIGHT BREAST: Primary | ICD-10-CM

## 2024-04-05 DIAGNOSIS — Z11.4 SCREENING FOR HIV (HUMAN IMMUNODEFICIENCY VIRUS): ICD-10-CM

## 2024-04-05 DIAGNOSIS — R76.8 RHEUMATOID FACTOR POSITIVE: ICD-10-CM

## 2024-04-05 PROCEDURE — 71046 X-RAY EXAM CHEST 2 VIEWS: CPT | Performed by: INTERNAL MEDICINE

## 2024-04-05 NOTE — PROGRESS NOTES
Follow Up Visit Note       Active Problems      1. Ductal carcinoma in situ (DCIS) of right breast    2. Lobular carcinoma in situ (LCIS) of right breast          Chief Complaint   Chief Complaint   Patient presents with    Breast Problem     6 month follow up- mammogram done 2024       Allergies  Rose has No Known Allergies.    Past Medical / Surgical / Social / Family History    The past medical and past surgical history have been reviewed by me today.    Past Medical History:   Diagnosis Date    ASCUS with positive high risk HPV cervical , 10/07    Breast cancer (HCC)     Cancer (HCC) 2022    Frequent UTI     reports frequent UTI's    H/O laparoscopy     Human papilloma virus infection 2009    pap ASCUS    Human papilloma virus infection 10/09/2007    pap ASCUS    Human papilloma virus infection 2012    pap negative    Human papilloma virus infection 2008    pap LSIL    Left elbow tendonitis 2020    Low grade squamous intraepithelial lesion (LGSIL) at risk for high grade squamous intraepithelial lesion (HGSIL) on cytologic smear of cervix 2012, ,     Negative HPV +, LSIL HPV +, LSIL    Pap smear for cervical cancer screening , , , 07/10, 01/10,     Negative HPV Negative    PONV (postoperative nausea and vomiting)     Wears glasses had glasses since 5yrs old     Past Surgical History:   Procedure Laterality Date    APPENDECTOMY      APPENDECTOMY  2004    Dr.Karen Spaulding removed during a cyst removal surgery    COLONOSCOPY      COLONOSCOPY      benign findings    COLPOSCOPY, CERVIX W/UPPER ADJACENT VAGINA; W/BIOPSY(S), CERVIX  2007    LUMPECTOMY RIGHT            no complications. NL preg. FT     OTHER SURGICAL HISTORY      uterine cyst removed    REDUCTION RIGHT      SKIN SURGERY  May 2023       The family history and social history have been reviewed by me today.    Social History     Tobacco Use    Smoking  status: Never    Smokeless tobacco: Never    Tobacco comments:     na   Substance Use Topics    Alcohol use: Yes     Alcohol/week: 2.0 standard drinks of alcohol     Types: 2 Glasses of wine per week     Comment: 3 glasses wine q weekend or less        Current Outpatient Medications:     letrozole 2.5 MG Oral Tab, Take 1 tablet (2.5 mg total) by mouth daily., Disp: 90 tablet, Rfl: 3    diclofenac 1 % External Gel, Apply 2 g topically 4 (four) times daily. Buy otc Voltaren, Disp: 150 g, Rfl: 0    Calcium Carbonate-Vitamin D (CALTRATE 600+D OR), Take by mouth., Disp: , Rfl:     Multiple Vitamin (MULTI-VITAMIN DAILY) Oral Tab, Take 1 tablet by mouth daily., Disp: , Rfl:     Acidophilus/Pectin Oral Cap, Take 1 capsule by mouth daily., Disp: , Rfl:      Review of Systems  The Review of Systems has been reviewed by me during today.  Review of Systems   Constitutional:  Negative for diaphoresis, fever and unexpected weight change.   HENT:  Negative for congestion, nosebleeds, sore throat and trouble swallowing.    Eyes:  Negative for pain, discharge, redness and visual disturbance.   Respiratory:  Negative for cough, shortness of breath and wheezing.    Cardiovascular:  Negative for chest pain and palpitations.   Gastrointestinal:  Negative for abdominal distention, abdominal pain, blood in stool, constipation, diarrhea, nausea and vomiting.   Genitourinary:  Negative for difficulty urinating, dysuria and frequency.   Musculoskeletal:  Negative for joint swelling and neck pain.   Skin:  Negative for color change and rash.   Neurological:  Negative for dizziness, syncope and light-headedness.   Hematological:  Negative for adenopathy. Does not bruise/bleed easily.   Psychiatric/Behavioral:  Negative for confusion, hallucinations and sleep disturbance.         Physical Findings   Pulse 86   Temp 98.2 °F (36.8 °C) (Temporal)   LMP 08/01/2016 (Approximate)   Physical Exam  Constitutional:       Appearance: She is  well-developed.   HENT:      Head: Normocephalic and atraumatic.   Eyes:      Pupils: Pupils are equal, round, and reactive to light.   Cardiovascular:      Rate and Rhythm: Normal rate and regular rhythm.   Pulmonary:      Effort: Pulmonary effort is normal.      Breath sounds: Normal breath sounds.   Chest:          Comments: Right breast lumpectomy site upper outer quadrant and lower inner quadrant.  Some distortion at the lower inner quadrant due to lumpectomy.  Left breast incision status post removal cutaneous benign lesion  Abdominal:      General: Bowel sounds are normal. There is no distension.      Palpations: Abdomen is soft.      Tenderness: There is no abdominal tenderness.   Musculoskeletal:         General: No deformity. Normal range of motion.   Skin:     General: Skin is warm and dry.      Findings: No rash.   Neurological:      Mental Status: She is alert and oriented to person, place, and time.       Breast: The patient is examined in the sitting and supine position.    The breast parenchyma is diffusely nodular.  Right Breast -no dominant masses are noted.  Right breast lumpectomy site upper outer quadrant and lower inner quadrant.  Some distortion at the lower inner quadrant due to lumpectomy.There is no abnormal dimpling or change in contour.  There is no skin erythema, edema or peau d'orange.  There is no nipple inversion and no discharge noted spontaneous or elicited.  No focal tenderness elicited.  Left Breast-no dominant masses are noted.  Left breast incision outer lower quadrant status post removal benign cutaneous lesion   there is no abnormal dimpling or change in contour.  There is no skin erythema, edema or peau d'orange.  There is no nipple inversion and no discharge noted spontaneous or elicited.  No focal tenderness elicited.  Bilaterally there is no evidence of supraclavicular, infraclavicular or axillary lymphadenopathy           History of Present Illness  58-year-old female who  is here today for follow-up after routine surveillance 6-month mammogram.  Patient was previously a patient of Dr. Baldwin.    The patient underwent right lumpectomy x 2 in August 2022.  Final pathology 11 o'clock position revealed LCIS, 4 o'clock position revealed DCIS.  The patient did see Dr Bardales and radiation oncology postoperatively.  She declined postoperative radiation therapy but is taking letrozole.  She reports no particular side effects with letrozole and is tolerating this well.    Surveillance mammogram performed February 2024 reveals no evidence of suspicious areas of distortion, masses, calcifications.    No family history of breast or ovarian cancer.  No family history of malignancy    Mammographic imaging was reviewed.  The patient is tolerating letrozole without difficulty.  She is currently asymptomatic with HOLLY    Assessment   1. Ductal carcinoma in situ (DCIS) of right breast    2. Lobular carcinoma in situ (LCIS) of right breast          Please note that this report has been produced using speech recognition software and may contain errors related to that system including but not limited to errors in grammar, punctuation and spelling as well as words and phrases that possibly may have been recognized inappropriately.  If there are any questions or concerns please contact the dictating provider for clarification.    Plan     The patient will follow up with a routine mammogram in 6 months followed by a repeat examination by myself.  She is encouraged to continue monthly self examination and in annual examination by a physician.  Technique for self examination was reviewed.  The patient will follow up with you for her routine care         No orders of the defined types were placed in this encounter.      Imaging & Referrals   [facility] 2D+3D DIAGNOSTIC [facility]  WINNIE (CPT=[id number])    Follow Up  No follow-ups on file.    Marlen Perkins MD

## 2024-04-11 LAB
ALBUMIN: 4.6 G/DL (ref 3.8–4.8)
ALPHA-1-GLOBULINS: 0.2 G/DL (ref 0.2–0.3)
ALPHA-2-GLOBULINS: 0.6 G/DL (ref 0.5–0.9)
BETA 1 GLOBULIN: 0.5 G/DL (ref 0.4–0.6)
BETA 2 GLOBULIN: 0.3 G/DL (ref 0.2–0.5)
GAMMA GLOBULINS: 1.1 G/DL (ref 0.8–1.7)
MAGNESIUM: 2.1 MG/DL (ref 1.5–2.5)
MITOGEN-NIL: 7.86 IU/ML
NIL: 0.02 IU/ML
PARATHYROID HORMONE,$INTACT: 25 PG/ML (ref 16–77)
PHOSPHATE (AS PHOSPHORUS): 3.5 MG/DL (ref 2.5–4.5)
PROTEIN, TOTAL: 7.3 G/DL (ref 6.1–8.1)
QUANTIFERON(R)-TB GOLD PLUS, 1 TUBE: NEGATIVE
TB1-NIL: 0 IU/ML
TB2-NIL: <0 IU/ML

## 2024-06-20 ENCOUNTER — PATIENT MESSAGE (OUTPATIENT)
Dept: HEMATOLOGY/ONCOLOGY | Facility: HOSPITAL | Age: 59
End: 2024-06-20

## 2024-06-20 DIAGNOSIS — D05.01 LOBULAR CARCINOMA IN SITU (LCIS) OF RIGHT BREAST: ICD-10-CM

## 2024-06-20 DIAGNOSIS — Z08 ENCOUNTER FOR FOLLOW-UP EXAMINATION AFTER COMPLETED TREATMENT FOR MALIGNANT NEOPLASM: ICD-10-CM

## 2024-06-20 DIAGNOSIS — D05.11 INTRADUCTAL CARCINOMA IN SITU OF RIGHT BREAST: ICD-10-CM

## 2024-06-20 DIAGNOSIS — M89.9 BONE DISORDER: ICD-10-CM

## 2024-06-20 DIAGNOSIS — D05.11 DUCTAL CARCINOMA IN SITU (DCIS) OF RIGHT BREAST: Primary | ICD-10-CM

## 2024-06-25 LAB
ABSOLUTE BASOPHILS: 71 CELLS/UL (ref 0–200)
ABSOLUTE EOSINOPHILS: 71 CELLS/UL (ref 15–500)
ABSOLUTE LYMPHOCYTES: 1621 CELLS/UL (ref 850–3900)
ABSOLUTE MONOCYTES: 344 CELLS/UL (ref 200–950)
ABSOLUTE NEUTROPHILS: 2092 CELLS/UL (ref 1500–7800)
ALBUMIN/GLOBULIN RATIO: 1.9 (CALC) (ref 1–2.5)
ALBUMIN: 4.6 G/DL (ref 3.6–5.1)
ALKALINE PHOSPHATASE: 100 U/L (ref 37–153)
ALT: 15 U/L (ref 6–29)
AST: 22 U/L (ref 10–35)
BASOPHILS: 1.7 %
BILIRUBIN, TOTAL: 0.5 MG/DL (ref 0.2–1.2)
BUN: 19 MG/DL (ref 7–25)
CALCIUM: 9.9 MG/DL (ref 8.6–10.4)
CARBON DIOXIDE: 32 MMOL/L (ref 20–32)
CHLORIDE: 98 MMOL/L (ref 98–110)
CREATININE: 0.77 MG/DL (ref 0.5–1.03)
EGFR: 89 ML/MIN/1.73M2
EOSINOPHILS: 1.7 %
GLOBULIN: 2.4 G/DL (CALC) (ref 1.9–3.7)
GLUCOSE: 91 MG/DL (ref 65–139)
HEMATOCRIT: 41 % (ref 35–45)
HEMOGLOBIN: 13.4 G/DL (ref 11.7–15.5)
LYMPHOCYTES: 38.6 %
MCH: 31.3 PG (ref 27–33)
MCHC: 32.7 G/DL (ref 32–36)
MCV: 95.8 FL (ref 80–100)
MONOCYTES: 8.2 %
MPV: 12.1 FL (ref 7.5–12.5)
NEUTROPHILS: 49.8 %
PLATELET COUNT: 218 THOUSAND/UL (ref 140–400)
POTASSIUM: 4.5 MMOL/L (ref 3.5–5.3)
PROTEIN, TOTAL: 7 G/DL (ref 6.1–8.1)
RDW: 12.3 % (ref 11–15)
RED BLOOD CELL COUNT: 4.28 MILLION/UL (ref 3.8–5.1)
SODIUM: 134 MMOL/L (ref 135–146)
VITAMIN D, 25-OH, TOTAL: 60 NG/ML (ref 30–100)
WHITE BLOOD CELL COUNT: 4.2 THOUSAND/UL (ref 3.8–10.8)

## 2024-06-26 ENCOUNTER — APPOINTMENT (OUTPATIENT)
Dept: HEMATOLOGY/ONCOLOGY | Facility: HOSPITAL | Age: 59
End: 2024-06-26
Attending: INTERNAL MEDICINE
Payer: COMMERCIAL

## 2024-06-26 NOTE — PROGRESS NOTES
[facility] Progress Note      Patient Name:  Rose Head  YOB: 1965  Medical Record Number:  [id number]    Date of visit:  2024    CHIEF COMPLAINT: R breast DCIS    HPI:     58 year old that I have followed since . R breast bx - 2.5 mm grade 2 DCIS, ER/AR+.  Lumpectomy -only ALH/LCIS.  RT deferred.  Started letrozole .  Held for a month due to elevated LFTs and resumed .  Presents for evaluation.       SOCIAL HISTORY:    ROS:     Constitutional: no fever, chills fatigue,night sweats or weight loss  Neurologic: no headache, seizures, diplopia or weakness  Respiratory: no cough, SOB or hemoptysis  Cardiovascular: no chest pain, ankle swelling, PACK  GI: no nausea, vomiting, diarrhea or BRBPR  Musculoskeletal: no body-ache or joint pain  Dermatologic: no alopecia, rash, pruritis  : no hematuria, dysuria or frequency  Psych: no confusion or depression   Heme: no easy bruising or bleeding     ALLERGIES:  No Known Allergies    MEDICATIONS:    Current Outpatient Medications   Medication Sig Dispense Refill    Multiple Vitamins-Minerals (HAIR SKIN AND NAILS FORMULA OR) Take by mouth.      letrozole 2.5 MG Oral Tab Take 1 tablet (2.5 mg total) by mouth daily. 90 tablet 3    diclofenac 1 % External Gel Apply 2 g topically 4 (four) times daily. Buy otc Voltaren 150 g 0    Calcium Carbonate-Vitamin D (CALTRATE 600+D OR) Take by mouth.      Multiple Vitamin (MULTI-VITAMIN DAILY) Oral Tab Take 1 tablet by mouth daily.      Acidophilus/Pectin Oral Cap Take 1 capsule by mouth daily.         VITALS:  Height: --  Weight: 52.8 kg (116 lb 8 oz) ( 115)  BSA (Calculated - sq m): --  Pulse: 89 (1150)  BP: 134/86 (1150)  Temp: 98.2 °F (36.8 °C) (1150)  Do Not Use - Resp Rate: --  SpO2: 98 % (1150)    PS:  ECO    PHYSICAL EXAM:    General: alert and oriented x 3, not in acute distress.  HEENT: MIN, oropharynx  clear.  Neck: supple.  No JVD  /adenopathy.  CVS: S1S2, regular  Rhythm, no murmurs.   Lungs: Clear to auscultation and percussion.  Abdomen: Soft, non tender, no hepato-splenomegaly.  Extremities:  No edema.  CNS: no focal deficit    Emotional well being: Patient's emotional well being was assessed.  No issues requiring acute psychosocial intervention were identified.     LABS:     No results found for this or any previous visit (from the past 24 hour(s)).    ASSESSMENT AND PLAN:     # R breast DCIS: s/p R breast bx 7/22-0.5 mm grade 2 DCIS, 100% ER/SC.  Lumpectomy 8/22 only residual ALH/LCIS.  RT was deferred.  She started letrozole 9/22.  Held briefly for elevated LFTs.  Then resumed again.  Plan to continue till 9/27.  She has few side effects that are manageable and she is motivated to continue.  [facility] mammogram 2/24 okay, bilateral ordered for 8/24.  Consider additional imaging for dense breasts-she will discuss with the surgeon.    # Osteopenia: DEXA 12/23 showed osteopenia with T-score -1.9, but worse compared to T-score of -1.2 in 12/21.  Continue calcium/vitamin D/weightbearing exercise.  Wishes to repeat DEXA in a year, ordered.  May proceed if covered.    # Elevated liver enzymes: Seen by GI.  MRI liver 12/22 benign cyst but otherwise unremarkable.  Normalized.    ORDERS PLACED:    Return in about 1 year (around 6/28/2025) for MD visit.    Angela Ortiz M.D.    [facility]    [address] [place], [address]    6/28/2024

## 2024-06-28 ENCOUNTER — OFFICE VISIT (OUTPATIENT)
Dept: HEMATOLOGY/ONCOLOGY | Facility: HOSPITAL | Age: 59
End: 2024-06-28
Attending: INTERNAL MEDICINE
Payer: COMMERCIAL

## 2024-06-28 VITALS
BODY MASS INDEX: 19 KG/M2 | DIASTOLIC BLOOD PRESSURE: 86 MMHG | WEIGHT: 116.5 LBS | SYSTOLIC BLOOD PRESSURE: 134 MMHG | OXYGEN SATURATION: 98 % | TEMPERATURE: 98 F | HEART RATE: 89 BPM

## 2024-06-28 DIAGNOSIS — M89.9 BONE DISORDER: Primary | ICD-10-CM

## 2024-06-28 DIAGNOSIS — Z78.0 POSTMENOPAUSAL: ICD-10-CM

## 2024-06-28 DIAGNOSIS — D05.11 DUCTAL CARCINOMA IN SITU (DCIS) OF RIGHT BREAST: ICD-10-CM

## 2024-06-28 DIAGNOSIS — T50.905D ADVERSE EFFECT OF DRUG, SUBSEQUENT ENCOUNTER: ICD-10-CM

## 2024-06-28 PROCEDURE — 99214 OFFICE O/P EST MOD 30 MIN: CPT | Performed by: INTERNAL MEDICINE

## 2024-06-28 NOTE — PROGRESS NOTES
Education Record    Learner:  Patient    Disease / Diagnosis:right DCIS  6 month FU    Barriers / Limitations:  None   Comments:    Method:  Discussion   Comments:    General Topics:  Plan of care reviewed   Comments:    Outcome:  Shows understanding   Comments:   Breast imaging up to date.   Taking letrozole.   Dexa scan up to date.   Feeling well.   No issue with Letrozole.   Pt aware when next mammogram is due, order in the system.   Saw Dr Perkins in April.

## 2024-07-25 DIAGNOSIS — D05.01 LOBULAR CARCINOMA IN SITU OF RIGHT BREAST: ICD-10-CM

## 2024-07-25 DIAGNOSIS — D05.11 DUCTAL CARCINOMA IN SITU OF RIGHT BREAST: Primary | ICD-10-CM

## 2024-08-26 ENCOUNTER — HOSPITAL ENCOUNTER (OUTPATIENT)
Dept: MAMMOGRAPHY | Facility: HOSPITAL | Age: 59
Discharge: HOME | End: 2024-08-26
Payer: COMMERCIAL

## 2024-08-26 DIAGNOSIS — D05.11 DUCTAL CARCINOMA IN SITU OF RIGHT BREAST: ICD-10-CM

## 2024-08-26 DIAGNOSIS — D05.01 LOBULAR CARCINOMA IN SITU OF RIGHT BREAST: ICD-10-CM

## 2024-08-26 PROCEDURE — 77061 BREAST TOMOSYNTHESIS UNI: CPT

## 2024-08-26 PROCEDURE — 77065 DX MAMMO INCL CAD UNI: CPT

## 2024-11-07 NOTE — CONSULTS
Breast Surgery New Patient Consultation    Rose Head is a 59 year old patient, referred by Dr. Ortiz, a patient of Dr. Mon, who presents for follow-up of right breast cancer.    History of Present Illness:   Ms. Rose Head is a 59 year old woman who presents for follow-up of right breast cancer.     She underwent right breast biopsy on 7/12/2022 that showed right breast DCIS at the 4 o'clock position.  There was DCIS at the 11 o'clock position as well as ALH and LCIS of the right breast.  She underwent right breast 2 site lumpectomy on 8/25/2022 by Dr. Baldwin.  The 11 o'clock position final pathology showed LCIS and ALH.  The 4 o'clock position showed no DCIS or malignancy, but biopsy site changes.  All margins were negative.  Radiation therapy was deferred.  She saw Dr. Ortiz with medical oncology and started letrozole in November 2022. She does not have any side effects from this medication.     Most recently she had a diagnostic mammogram on 8/26/2024.  This showed density C breast tissue.  There was stable postoperative changes and no areas of suspicion.     She denies any palpable masses, nipple discharge, skin changes, or axillary adenopathy. She does not have breast pain.  She does have family history of breast cancer.  Her mother had breast cancer at age 56.  She does not have a history of genetic testing.        Past Medical History:    ASCUS with positive high risk HPV cervical    Breast cancer (HCC)    Cancer (HCC)    Frequent UTI    reports frequent UTI's    H/O laparoscopy    Human papilloma virus infection    pap ASCUS    Human papilloma virus infection    pap ASCUS    Human papilloma virus infection    pap negative    Human papilloma virus infection    pap LSIL    Left elbow tendonitis    Low grade squamous intraepithelial lesion (LGSIL) at risk for high grade squamous intraepithelial lesion (HGSIL) on cytologic smear of cervix    Negative HPV +, LSIL HPV +, LSIL    Pap smear for  cervical cancer screening    Negative HPV Negative    PONV (postoperative nausea and vomiting)    Wears glasses       Past Surgical History:   Procedure Laterality Date    Appendectomy      Appendectomy  2004    Dr.Karen Spaulding removed during a cyst removal surgery    Colonoscopy      Colonoscopy  2018    benign findings    Colposcopy, cervix w/upper adjacent vagina; w/biopsy(s), cervix  2007    Lumpectomy right  10/2022    2 site 11 'clock LCIS and 4 o'clock DCIS          no complications. NL preg. FT     Other surgical history      uterine cyst removed    Reduction right      Skin surgery  May 2023       Gynecological History:  Menarche at age 13 and LMP unknown  Pt is a   Pt was 24 years old at time of first pregnancy.    She denies any cumulative breastfeeding history  Age of Menopause: unknown  Type: natural  She denies any history of hormone replacement therapy   She has history of oral contraceptive use in the past, last .   She denies infertility treatment to achieve pregnancy.    Medications:     Multiple Vitamins-Minerals (HAIR SKIN AND NAILS FORMULA OR) Take by mouth.      letrozole 2.5 MG Oral Tab Take 1 tablet (2.5 mg total) by mouth daily. 90 tablet 3    diclofenac 1 % External Gel Apply 2 g topically 4 (four) times daily. Buy otc Voltaren 150 g 0    Calcium Carbonate-Vitamin D (CALTRATE 600+D OR) Take by mouth.      Multiple Vitamin (MULTI-VITAMIN DAILY) Oral Tab Take 1 tablet by mouth daily.      Acidophilus/Pectin Oral Cap Take 1 capsule by mouth daily.        triamcinolone acetonide (Kenalog-40) 40 MG/ML injection 40 mg  40 mg Intra-articular Once Cole Conti MD           Allergies:    Allergies[1]    Family History:   Family History   Problem Relation Age of Onset    Breast Cancer Self 56    DCIS Self 56    LCIS Self 56    Breast Cancer Mother 56    High Blood Pressure Mother     Heart Disease Father         unknown    Diabetes Father         unknown    No  Known Problems Son     Other (Other) Maternal Grandmother         osteoporosis    Heart Disease Paternal Grandfather         unknown       She is not of Ashkenazi Yazidi ancestry.    Social History:  History   Alcohol Use    2.0 standard drinks of alcohol/week    2 Glasses of wine per week     Comment: 3 glasses wine q weekend or less       History   Smoking Status    Never   Smokeless Tobacco    Never       Review of Systems:    Review of Systems   Constitutional:  Negative for activity change, appetite change, chills, fatigue and unexpected weight change.   HENT:  Negative for ear pain, hearing loss, nosebleeds, sore throat, trouble swallowing and voice change.    Eyes:  Negative for pain and visual disturbance.   Respiratory:  Negative for cough, chest tightness and shortness of breath.    Cardiovascular:  Negative for chest pain, palpitations and leg swelling.   Gastrointestinal:  Negative for nausea, vomiting, abdominal pain, diarrhea, constipation and blood in stool.   Endocrine: Negative for cold intolerance and heat intolerance.   Genitourinary:  Negative for dysuria, hematuria and difficulty urinating.   Musculoskeletal:  Negative for back pain, joint swelling, joint pain and neck pain.   Skin:  Negative for color change, rash and wound.   Allergic/Immunologic: Negative for environmental allergies.   Neurological:  Negative for tremors, syncope, facial asymmetry, speech difficulty and weakness.   Hematological:  Negative for adenopathy. Does not bruise/bleed easily.   Psychiatric/Behavioral:  Negative for hallucinations, behavioral problems, confusion, agitation and depressed mood.       Otherwise as per HPI.    Physical Exam:    BP (!) 159/92   Pulse 89   Temp 97.9 °F (36.6 °C) (Tympanic)   Resp 16   Wt 52.7 kg (116 lb 3.2 oz)   LMP 08/01/2016 (Approximate)   SpO2 98%   BMI 19.34 kg/m²     Physical Exam  Vitals reviewed.   Constitutional:       Appearance: Normal appearance.   HENT:      Head:  Normocephalic and atraumatic.   Eyes:      Extraocular Movements: Extraocular movements intact.      Pupils: Pupils are equal, round, and reactive to light.   Cardiovascular:      Rate and Rhythm: Normal rate.   Pulmonary:      Effort: Pulmonary effort is normal.   Chest:   Breasts:     Right: Normal. No mass, nipple discharge, skin change or tenderness.      Left: Normal. No mass, nipple discharge, skin change or tenderness.          Comments: R breast 2 site lumpectomy scars  L breast scar from skin lesion excision  Abdominal:      General: Abdomen is flat.      Palpations: Abdomen is soft.   Musculoskeletal:         General: Normal range of motion.      Cervical back: Normal range of motion and neck supple.   Lymphadenopathy:      Upper Body:      Right upper body: No supraclavicular or axillary adenopathy.      Left upper body: No supraclavicular or axillary adenopathy.   Skin:     General: Skin is warm and dry.   Neurological:      General: No focal deficit present.      Mental Status: She is alert and oriented to person, place, and time.   Psychiatric:         Mood and Affect: Mood normal.          Bra size: 34B (S)    Labs/imaging: reviewed in EPIC    Impression:   Ms. Rose Head is a 59 year old woman that presents for follow-up of right breast DCIS, LCIS/ALH status post right breast 2 site lumpectomy in August 2022    Discussion and Plan:  I had a discussion with the Patient regarding her breast exam. On exam today I found no evidence of disease. I personally reviewed her recent imaging and we discussed this.    She has healed well.  No evidence of disease on exam.     Surgical plan: Complete  Medical oncology: On letrozole  Radiation oncology: Deferred   Lymphedema: No increased risk     Return to clinic: around August 2025    Further imaging:   Mammogram: Annual screening mammogram February 2025 (ordered)    MRI: Patient would benefit from annual MRI screening due to density C breast tissue and  history of noninvasive breast cancer and high risk for breast cancer at 6-month intervals for mammogram, August 2025 (ordered)    She was given ample opportunity for questions and those questions were answered to her satisfaction. She has been encouraged to contact the office with any questions or concerns. This encounter lasted a total of 55 minutes, more than 50% of which was dedicated to the discussion of management options.     Alok Zacarias MD  Breast Surgical Oncology    CC: Dr. Mon            [1] No Known Allergies

## 2024-11-12 ENCOUNTER — OFFICE VISIT (OUTPATIENT)
Dept: SURGERY | Facility: CLINIC | Age: 59
End: 2024-11-12
Payer: COMMERCIAL

## 2024-11-12 VITALS
SYSTOLIC BLOOD PRESSURE: 159 MMHG | RESPIRATION RATE: 16 BRPM | DIASTOLIC BLOOD PRESSURE: 92 MMHG | WEIGHT: 116.19 LBS | BODY MASS INDEX: 19 KG/M2 | HEART RATE: 89 BPM | TEMPERATURE: 98 F | OXYGEN SATURATION: 98 %

## 2024-11-12 DIAGNOSIS — Z86.000 HISTORY OF DUCTAL CARCINOMA IN SITU (DCIS) OF RIGHT BREAST: Primary | ICD-10-CM

## 2024-11-12 DIAGNOSIS — R92.333 HETEROGENEOUSLY DENSE TISSUE OF BOTH BREASTS ON MAMMOGRAPHY: ICD-10-CM

## 2024-11-12 DIAGNOSIS — Z12.31 ENCOUNTER FOR SCREENING MAMMOGRAM FOR MALIGNANT NEOPLASM OF BREAST: ICD-10-CM

## 2024-11-29 ENCOUNTER — HOSPITAL ENCOUNTER (OUTPATIENT)
Dept: BONE DENSITY | Age: 59
Discharge: HOME | End: 2024-11-29
Attending: INTERNAL MEDICINE
Payer: COMMERCIAL

## 2024-11-29 DIAGNOSIS — Z78.0 POSTMENOPAUSAL: ICD-10-CM

## 2024-11-29 DIAGNOSIS — M89.9 BONE DISORDER: ICD-10-CM

## 2024-11-29 PROCEDURE — 77080 DXA BONE DENSITY AXIAL: CPT | Performed by: INTERNAL MEDICINE

## 2024-12-24 DIAGNOSIS — D05.11 INTRADUCTAL CARCINOMA IN SITU OF RIGHT BREAST: ICD-10-CM

## 2024-12-24 RX ORDER — LETROZOLE 2.5 MG/1
2.5 TABLET, FILM COATED ORAL DAILY
Qty: 90 TABLET | Refills: 3 | Status: SHIPPED | OUTPATIENT
Start: 2024-12-24

## 2025-01-22 ENCOUNTER — OFFICE VISIT (OUTPATIENT)
Dept: FAMILY MEDICINE CLINIC | Facility: CLINIC | Age: 60
End: 2025-01-22
Payer: COMMERCIAL

## 2025-01-22 VITALS
TEMPERATURE: 97 F | SYSTOLIC BLOOD PRESSURE: 134 MMHG | WEIGHT: 119 LBS | RESPIRATION RATE: 16 BRPM | HEART RATE: 81 BPM | OXYGEN SATURATION: 96 % | BODY MASS INDEX: 20 KG/M2 | DIASTOLIC BLOOD PRESSURE: 80 MMHG

## 2025-01-22 DIAGNOSIS — R09.81 SINUS CONGESTION: ICD-10-CM

## 2025-01-22 DIAGNOSIS — J06.9 VIRAL URI: Primary | ICD-10-CM

## 2025-01-22 LAB
COVID19 BINAX NOW ANTIGEN: NOT DETECTED
OPERATOR ID: NORMAL

## 2025-01-22 PROCEDURE — 99213 OFFICE O/P EST LOW 20 MIN: CPT | Performed by: FAMILY MEDICINE

## 2025-01-22 PROCEDURE — 3079F DIAST BP 80-89 MM HG: CPT | Performed by: FAMILY MEDICINE

## 2025-01-22 PROCEDURE — 87637 SARSCOV2&INF A&B&RSV AMP PRB: CPT | Performed by: FAMILY MEDICINE

## 2025-01-22 PROCEDURE — 3075F SYST BP GE 130 - 139MM HG: CPT | Performed by: FAMILY MEDICINE

## 2025-01-22 RX ORDER — FLUTICASONE PROPIONATE 50 MCG
2 SPRAY, SUSPENSION (ML) NASAL DAILY
Qty: 16 G | Refills: 0 | Status: SHIPPED | OUTPATIENT
Start: 2025-01-22 | End: 2026-01-17

## 2025-01-22 NOTE — PROGRESS NOTES
HPI:     Rose Head is a 59 year old female presents for    Sick visit.  She normally sees Dr. Mon.  For symptoms started 5 days ago with slight sore throat.  Was having some postnasal drip.  Was having some ear pressure.  Was started to think she was getting better and then worsened yesterday.  Felt increased pressure in face and ears.  No drainage from nose.  No cough or breathing difficulties.  No chills or bodyaches.  No documented fevers.  No sick contacts.  Took 1 dose of DayQuil which did not help.       Medications (Active prior to today's visit):  Current Outpatient Medications   Medication Sig Dispense Refill    LETROZOLE 2.5 MG Oral Tab TAKE 1 TABLET BY MOUTH EVERY DAY 90 tablet 3    Multiple Vitamins-Minerals (HAIR SKIN AND NAILS FORMULA OR) Take by mouth.      diclofenac 1 % External Gel Apply 2 g topically 4 (four) times daily. Buy otc Voltaren 150 g 0    Calcium Carbonate-Vitamin D (CALTRATE 600+D OR) Take by mouth.      Multiple Vitamin (MULTI-VITAMIN DAILY) Oral Tab Take 1 tablet by mouth daily.      Acidophilus/Pectin Oral Cap Take 1 capsule by mouth daily.         Allergies:  Allergies[1]    PSFH elements reviewed from today and agreed except as otherwise stated in HPI.  ROS:      Pertinent positives and negatives noted in the the HPI.    PHYSICAL EXAM:     Vitals:    01/22/25 1135   BP: 134/80   BP Location: Left arm   Patient Position: Sitting   Cuff Size: adult   Pulse: 81   Resp: 16   Temp: 97.3 °F (36.3 °C)   TempSrc: Temporal   SpO2: 96%   Weight: 119 lb (54 kg)     Vital signs reviewed.Appears stated age, well groomed.  Physical Exam  Constitutional:       General: She is awake.      Appearance: Normal appearance. She is well-developed.      Comments: Congested sounding   HENT:      Right Ear: Ear canal normal. A middle ear effusion (clear) is present.      Left Ear: Ear canal normal. A middle ear effusion (clear) is present.      Nose:      Right Turbinates: Enlarged and  swollen.      Left Turbinates: Not enlarged or swollen.      Mouth/Throat:      Mouth: Mucous membranes are moist.      Pharynx: Oropharynx is clear.   Cardiovascular:      Rate and Rhythm: Normal rate and regular rhythm.      Pulses: Normal pulses.      Heart sounds: No murmur heard.     No friction rub. No gallop.   Pulmonary:      Effort: Pulmonary effort is normal. No respiratory distress.      Breath sounds: No wheezing, rhonchi or rales.   Musculoskeletal:         General: No tenderness.      Cervical back: Neck supple.      Right lower leg: No edema.      Left lower leg: No edema.   Lymphadenopathy:      Head:      Right side of head: No submental, submandibular, preauricular or posterior auricular adenopathy.      Left side of head: No submental, submandibular, preauricular or posterior auricular adenopathy.      Cervical: No cervical adenopathy.      Upper Body:      Right upper body: No supraclavicular adenopathy.      Left upper body: No supraclavicular adenopathy.   Skin:     General: Skin is warm.   Neurological:      General: No focal deficit present.      Mental Status: She is alert.   Psychiatric:         Mood and Affect: Mood normal.         Speech: Speech normal.         Behavior: Behavior normal. Behavior is cooperative.        ASSESSMENT/PLAN:   59 year old female with    1. Viral URI    2. Sinus congestion      1.  Suspect viral URI.  No evidence of bacterial infection found on exam.  Rapid COVID test negative in office.  Will send out nasal swab for COVID/RSV/flu  2.  Can use Flonase and OTC decongestant to help with symptoms for now.  Instructed on proper use  3.  If no improvement or worsening symptoms over the next 3 days, notify office, consider antibiotics for sinusitis at that time  4.  Strongly encouraged patient to make a follow-up appointment with her PCP, Dr. Mon, for a physical as it has been over a year.    Patient/Caregiver Education: There are no barriers to learning. Medical  education done.   Outcome: Patient verbalizes understanding and agrees with plan. Advised to call or RTC if symptoms persist or worsen.    1/22/2025  Zahra Salinas, DO    Patient understands plan and follow-up.       [1] No Known Allergies

## 2025-01-23 LAB
FLUAV + FLUBV RNA SPEC NAA+PROBE: NOT DETECTED
FLUAV + FLUBV RNA SPEC NAA+PROBE: NOT DETECTED
RSV RNA SPEC NAA+PROBE: DETECTED
SARS-COV-2 RNA RESP QL NAA+PROBE: NOT DETECTED

## 2025-02-19 RX ORDER — FLUTICASONE PROPIONATE 50 MCG
2 SPRAY, SUSPENSION (ML) NASAL DAILY
Qty: 16 ML | Refills: 0 | OUTPATIENT
Start: 2025-02-19

## 2025-02-19 NOTE — TELEPHONE ENCOUNTER
Refill(s) Requested:   Requested Prescriptions     Pending Prescriptions Disp Refills    FLUTICASONE PROPIONATE 50 MCG/ACT Nasal Suspension [Pharmacy Med Name: FLUTICASONE PROP 50 MCG SPRAY] 16 mL 0     Sig: SPRAY 2 SPRAYS INTO EACH NOSTRIL EVERY DAY     Medication Last Prescribed:  1/22/2025 16 g 0 refills     Has dose or medication been changed    since last prescription? *Review notes*    []  Yes       [x]  No     Last office visit: 1/22/2025 (in-office)  Visit date not found (virtual visit)     Future Appointments: Visit date not found     Action taken:  [x] Medication refill too soon

## 2025-02-20 ENCOUNTER — TELEPHONE (OUTPATIENT)
Dept: SURGERY | Facility: CLINIC | Age: 60
End: 2025-02-20

## 2025-02-20 DIAGNOSIS — Z86.000 HISTORY OF DUCTAL CARCINOMA IN SITU (DCIS) OF RIGHT BREAST: ICD-10-CM

## 2025-02-20 DIAGNOSIS — R92.333 HETEROGENEOUSLY DENSE TISSUE OF BOTH BREASTS ON MAMMOGRAPHY: Primary | ICD-10-CM

## 2025-02-20 NOTE — TELEPHONE ENCOUNTER
Called patient to discuss breast imaging. Due to insurance concerns, will proceed with diagnostic mammogram and then, in 6 months, bilateral screening ultrasound. These orders have been placed.     Alok Zacarias MD  Breast Surgical Oncology

## 2025-03-06 ENCOUNTER — OFFICE VISIT (OUTPATIENT)
Dept: OBGYN CLINIC | Facility: CLINIC | Age: 60
End: 2025-03-06
Payer: COMMERCIAL

## 2025-03-06 VITALS
HEIGHT: 65 IN | DIASTOLIC BLOOD PRESSURE: 88 MMHG | BODY MASS INDEX: 19.83 KG/M2 | SYSTOLIC BLOOD PRESSURE: 126 MMHG | WEIGHT: 119 LBS

## 2025-03-06 DIAGNOSIS — Z12.4 SCREENING FOR CERVICAL CANCER: ICD-10-CM

## 2025-03-06 DIAGNOSIS — Z01.419 ENCOUNTER FOR WELL WOMAN EXAM WITH ROUTINE GYNECOLOGICAL EXAM: Primary | ICD-10-CM

## 2025-03-06 PROCEDURE — 87624 HPV HI-RISK TYP POOLED RSLT: CPT | Performed by: OBSTETRICS & GYNECOLOGY

## 2025-03-06 PROCEDURE — 3008F BODY MASS INDEX DOCD: CPT | Performed by: OBSTETRICS & GYNECOLOGY

## 2025-03-06 PROCEDURE — 3074F SYST BP LT 130 MM HG: CPT | Performed by: OBSTETRICS & GYNECOLOGY

## 2025-03-06 PROCEDURE — 99459 PELVIC EXAMINATION: CPT | Performed by: OBSTETRICS & GYNECOLOGY

## 2025-03-06 PROCEDURE — 88175 CYTOPATH C/V AUTO FLUID REDO: CPT | Performed by: OBSTETRICS & GYNECOLOGY

## 2025-03-06 PROCEDURE — 99396 PREV VISIT EST AGE 40-64: CPT | Performed by: OBSTETRICS & GYNECOLOGY

## 2025-03-06 PROCEDURE — 3079F DIAST BP 80-89 MM HG: CPT | Performed by: OBSTETRICS & GYNECOLOGY

## 2025-03-06 NOTE — PROGRESS NOTES
[facility]  Obstetrics and Gynecology    Subjective:     Rose Head is a 59 year old  who presents for an annual gyn exam. Patient complaints include - none. Recent h/o right breast LCIS, continues to take Letrozole, follows with Dr. Zacarias and Dr. Ortiz.    Patient's last menstrual period was 2016 (approximate).   Menarche: 13 (3/6/2025  3:29 PM)  Period Cycle (Days): menopause (3/6/2025  3:29 PM)  Period Duration (Days): na (3/6/2025  3:29 PM)  Period Flow: na (3/6/2025  3:29 PM)  Use of Birth Control (if yes, specify type): Postmenopausal (3/6/2025  3:29 PM)  Hx Prior Abnormal Pap: No (3/6/2025  3:29 PM)  Pap Date: 22 (3/6/2025  3:29 PM)  Pap Result Notes: wnl (3/6/2025  3:29 PM)     Dyspareunia: No.    Difficulty with bowel or bladder function: No.   History of STDs: No.  Smoker: No.  Safe at home: Yes.  , adult son, works in manufacturing.     Screening:  Pap smear: neg   Mammogram: 2024 - BI-RADS 2  Colonoscopy: 2018   DEXA: No recommendations at this time Last DEXA done 2024          Review of Systems  Constitutional: Denies fever/chills, weight loss, fatigue, weakness, or sweating  HEENT: Denies headache, hearing loss or tinnitus, ear pain or discharge, nosebleeds, congestion, sore throat  Eye: Denies visual changes, eye pain or discharge or redness  Cardiovascular: Denies chest pain, palpitations  Pulmonary: Denies cough, shortness of breath, wheezing  Breast: denies breast pain or palpable mass, skin changes, nipple discharge  GI: Denies nausea, vomiting, diarrhea, constipation, heartburn, hemachezia  : Denies dysuria, urgency, frequency, hematuria, flank pain  Musculoskeletal: Denies myalgias, pain in neck or back or joints  Skin: Denies rash, itching  Endocrine: Denies easy bruising or bleeding, increased thirst  Neuro: Denies dizziness, paraesthesias, focal weakness, seizures, loss of consciousness  Psych: Denies depression, anxiety,  suicidal ideations, hallucinations, insomnia     Past Medical History   Past Medical History:    ASCUS with positive high risk HPV cervical    Breast cancer (HCC)    Cancer (HCC)    Frequent UTI    reports frequent UTI's    H/O laparoscopy    Human papilloma virus infection    pap ASCUS    Human papilloma virus infection    pap ASCUS    Human papilloma virus infection    pap negative    Human papilloma virus infection    pap LSIL    Left elbow tendonitis    Low grade squamous intraepithelial lesion (LGSIL) at risk for high grade squamous intraepithelial lesion (HGSIL) on cytologic smear of cervix    Negative HPV +, LSIL HPV +, LSIL    Pap smear for cervical cancer screening    Negative HPV Negative    PONV (postoperative nausea and vomiting)    Wears glasses         Past Obstetric History   OB History    Para Term  AB Living   1 1 1     1   SAB IAB Ectopic Multiple Live Births                  # Outcome Date GA Lbr Mike/2nd Weight Sex Type Anes PTL Lv   1 Term 89    M NORMAL SPONT         Obstetric Comments   Menarche: 14 y/o   Menopause: 2016   OCP use x 30 yrs, stopped recently   Breasfeed: NO   BRA SIZE: 34B       Past Surgical History   Past Surgical History:   Procedure Laterality Date    Appendectomy      Appendectomy  2004    Dr.Karen Spaulding removed during a cyst removal surgery    Colonoscopy      Colonoscopy      benign findings    Colposcopy, cervix w/upper adjacent vagina; w/biopsy(s), cervix  2007    Lumpectomy right  10/2022    2 site 11 'clock LCIS and 4 o'clock DCIS          no complications. NL preg. FT     Other surgical history      uterine cyst removed    Reduction right      Skin surgery  May 2023        Family History   Family History   Problem Relation Age of Onset    Breast Cancer Self 56    DCIS Self 56    LCIS Self 56    Breast Cancer Mother 56    High Blood Pressure Mother     Heart Disease Father         unknown    Diabetes Father          unknown    No Known Problems Son     Other (Other) Maternal Grandmother         osteoporosis    Heart Disease Paternal Grandfather         unknown        Social History   Social History     Socioeconomic History    Marital status:      Spouse name: Ranjan    Number of children: 1   Occupational History    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   Tobacco Use    Smoking status: Never    Smokeless tobacco: Never    Tobacco comments:     na   Vaping Use    Vaping status: Never Used   Substance and Sexual Activity    Alcohol use: Yes     Alcohol/week: 2.0 standard drinks of alcohol     Types: 2 Glasses of wine per week     Comment: 3 glasses wine q weekend or less    Drug use: No    Sexual activity: Yes     Partners: Male   Other Topics Concern    Caffeine Concern Yes    Exercise Yes    Seat Belt Yes    Special Diet No    Stress Concern No    Weight Concern No   Social History Narrative    , lives alone    Has 1 son, [place] the city        Medications   Medications Ordered Prior to Encounter[1]    Allergies   Allergies[2]       Objective:     Vitals:    03/06/25 1528   BP: 126/88   Weight: 119 lb (54 kg)   Height: 65\"       Body mass index is 19.8 kg/m².    GEN: AAOx3, NAD, appears well, appears stated age  HEENT: normocephalic, atraumatic, thyroid symmetric without enlargement or nodularity  RESP: breathing comfortably on room air  BREAST: bilaterally symmetric with no palpable masses, no nipple discharge, no skin changes. Well healed scar from lumpectomy.   ABD: soft, NT, ND, no rebound or guarding  EXT: no c/c/e or tenderness  NEURO: CN 2-12 grossly intact  SKIN: no lesions of note  PELVIC:   Vulva: NEFG.   Vagina: hypoestrogenized, physiologic discharge.    Cervix: No lesions or tenderness.     Uterus: anteverted, 6 week size, firm, mobile, nontender.     Adnexa: No masses or tenderness.     Rectal: Anus and perineum are normal.    Chaperone offered and declined.     Assessment:     Rose Head is a  59 year old  seen for well-women gyn exam.    Plan:     -- cervical cancer screening: Pap with HPV co-testing done today.   -- breast cancer screening: continue annual CBE and twice yearly imaging per oncologist  -- STD screening ordered: No  -- Discussed further preventative care with PCP, staying up to date with screening and vaccinations, and maintaining healthy diet and exercise.      -- Follow up in 1 year for annual exam or sooner as needed    Ashley Davies MD  [facility]  3/6/2025 3:58 PM           [1]   Current Outpatient Medications on File Prior to Visit   Medication Sig Dispense Refill    LETROZOLE 2.5 MG Oral Tab TAKE 1 TABLET BY MOUTH EVERY DAY 90 tablet 3    Multiple Vitamins-Minerals (HAIR SKIN AND NAILS FORMULA OR) Take by mouth.      Calcium Carbonate-Vitamin D (CALTRATE 600+D OR) Take by mouth.      Multiple Vitamin (MULTI-VITAMIN DAILY) Oral Tab Take 1 tablet by mouth daily.      Acidophilus/Pectin Oral Cap Take 1 capsule by mouth daily.      diclofenac 1 % External Gel Apply 2 g topically 4 (four) times daily. Buy otc Voltaren (Patient not taking: Reported on 3/6/2025) 150 g 0     Current Facility-Administered Medications on File Prior to Visit   Medication Dose Route Frequency Provider Last Rate Last Admin    triamcinolone acetonide (Kenalog-40) 40 MG/ML injection 40 mg  40 mg Intra-articular Once Cole Conti MD       [2] No Known Allergies

## 2025-03-07 LAB — HPV E6+E7 MRNA CVX QL NAA+PROBE: NEGATIVE

## 2025-04-18 ENCOUNTER — HOSPITAL ENCOUNTER (OUTPATIENT)
Dept: MAMMOGRAPHY | Facility: HOSPITAL | Age: 60
Discharge: HOME | End: 2025-04-18
Attending: SURGERY
Payer: COMMERCIAL

## 2025-04-18 DIAGNOSIS — Z86.000 HISTORY OF DUCTAL CARCINOMA IN SITU (DCIS) OF RIGHT BREAST: ICD-10-CM

## 2025-04-18 DIAGNOSIS — R92.333 HETEROGENEOUSLY DENSE TISSUE OF BOTH BREASTS ON MAMMOGRAPHY: ICD-10-CM

## 2025-04-18 PROCEDURE — 77062 BREAST TOMOSYNTHESIS BI: CPT | Performed by: SURGERY

## 2025-04-18 PROCEDURE — 77066 DX MAMMO INCL CAD BI: CPT | Performed by: SURGERY

## 2025-08-11 DIAGNOSIS — M89.9 BONE DISORDER: ICD-10-CM

## 2025-08-11 DIAGNOSIS — D05.11 INTRADUCTAL CARCINOMA IN SITU OF RIGHT BREAST: Primary | ICD-10-CM

## (undated) DIAGNOSIS — R92.1 BREAST CALCIFICATIONS: Primary | ICD-10-CM

## (undated) DEVICE — 3M(TM) MICROPORE TAPE DISPENSER 1535-2: Brand: 3M™ MICROPORE™

## (undated) DEVICE — BREAST-HERNIA-PORT CDS-LF: Brand: MEDLINE INDUSTRIES, INC.

## (undated) DEVICE — SUT SILK 0 FSL 678G

## (undated) DEVICE — SUPER SPONGES,MEDIUM: Brand: KERLIX

## (undated) DEVICE — LIGHT HANDLE

## (undated) DEVICE — SLEEVE KENDALL SCD EXPRESS MED

## (undated) DEVICE — VIOLET BRAIDED (POLYGLACTIN 910), SYNTHETIC ABSORBABLE SUTURE: Brand: COATED VICRYL

## (undated) DEVICE — Device

## (undated) DEVICE — UNDYED BRAIDED (POLYGLACTIN 910), SYNTHETIC ABSORBABLE SUTURE: Brand: COATED VICRYL

## (undated) DEVICE — STERILE POLYISOPRENE POWDER-FREE SURGICAL GLOVES: Brand: PROTEXIS

## (undated) DEVICE — SOLUTION  .9 1000ML BTL

## (undated) NOTE — Clinical Note
I had the pleasure of seeing Mag Patino on 7/19/2022. Please see my attached note.     Mesha Tan MD FACS  [facility]

## (undated) NOTE — LETTER
10/31/18        [address]      Dear Cydney Saint,    Our records indicate that you have outstanding lab work and or testing that was ordered for you and has not yet been completed:  Orders Placed This Encounter

## (undated) NOTE — Clinical Note
I had the pleasure of seeing Everlene Plants on 8/30/2022. Please see my attached note.     Agustín Davila MD FACS  [facility]

## (undated) NOTE — MR AVS SNAPSHOT
After Visit Summary   10/17/2019    Jackelyn Leon    MRN: [id number]           Visit Information     Date & Time  10/17/2019  4:45 PM Provider  Kristi Zhang MD Department  [facility] [address]  Dept.  Phone  [phone]      [facility] Scheduling Instructions     Around October 17, 2019   Imaging:   [facility] LARRY 2D+3D SCREENING BILAT ([id number])    Instructions:   To schedule an appointment for your radiology test please call [facility] Scheduling at [phone] Monday - Friday  4:00 pm - 10:00 pm  Saturday - Sunday  10:00 am - 4:00 pm       Conditions needing urgent attention, but are not life-threatening.          Average cost  $120*       EMERGENCY ROOM         Life-threatening emergencies needing immediate inte

## (undated) NOTE — MR AVS SNAPSHOT
[address] [place]  [phone]               Thank you for choosing us for your health care visit with Brenton Del Angel MD.  We are glad to serve you and happy to provide you with this sum - Nitrofurantoin Monohyd Macro 100 MG Caps            Results of Recent Testing     URINALYSIS NONAUTO W/O SCOPE      Component Value Standard Range & Units    GLUCOSE (URINE DIPSTICK) neg Negative mg/dL    BILIRUBIN neg Negative    KETONES (URINE DIPSTIC

## (undated) NOTE — LETTER
04/15/24    Patient: Rose Head  : 10/26/1965 Visit date: 2024    Dear  Omaira Mon,     Thank you for referring Rose Head to my practice.  Please find my assessment and plan below.    History of Present Illness  58-year-old female who is here today for follow-up after routine surveillance 6-month mammogram.  Patient was previously a patient of Dr. Baldwin.    The patient underwent right lumpectomy x 2 in 2022.  Final pathology 11 o'clock position revealed LCIS, 4 o'clock position revealed DCIS.  The patient did see Dr Bardales and radiation oncology postoperatively.  She declined postoperative radiation therapy but is taking letrozole.  She reports no particular side effects with letrozole and is tolerating this well.    Surveillance mammogram performed 2024 reveals no evidence of suspicious areas of distortion, masses, calcifications.    No family history of breast or ovarian cancer.  No family history of malignancy    Mammographic imaging was reviewed.  The patient is tolerating letrozole without difficulty.  She is currently asymptomatic with HOLLY    Assessment   1. Ductal carcinoma in situ (DCIS) of right breast    2. Lobular carcinoma in situ (LCIS) of right breast          Please note that this report has been produced using speech recognition software and may contain errors related to that system including but not limited to errors in grammar, punctuation and spelling as well as words and phrases that possibly may have been recognized inappropriately.  If there are any questions or concerns please contact the dictating provider for clarification.    Plan     The patient will follow up with a routine mammogram in 6 months followed by a repeat examination by myself.  She is encouraged to continue monthly self examination and in annual examination by a physician.  Technique for self examination was reviewed.  The patient will follow up with you for her routine care            Sincerely,       Marlen Perkins MD   CC:   No Recipients

## (undated) NOTE — Clinical Note
I had the pleasure of seeing Franco Bearden on 8/22/2023. Please see my attached note.   Sue Duran MD FACS [facility]--Surgery

## (undated) NOTE — Clinical Note
Back on Letrozole for a week. Without OCP and with letrozole, having decent hot flashes. Not interested at this point in medications. Will see you in March and discuss. Liver evaluation ongoing with bloodwork and MRI next week.

## (undated) NOTE — Clinical Note
05/15/2017        Lynnette Head  [address] [facility]      Dear [place] records indicate that you have outstanding lab work and or testing that was ordered for you and has not yet been completed:      CBC With Diff  CMP  TSH W

## (undated) NOTE — MR AVS SNAPSHOT
[facility]  [address] [id number] [phone]               Thank you for choosing us for your health care visit with Adenike Mehta DO. We are glad to serve you and happy to provide you with this summary of your visit.   Pl Assoc Dx:  Routine general medical examination at a health care facility [Z00.00], Immunity status testing [Z01.84]           Vitamin D, 25-Hydroxy    Complete by:  Apr 14, 2017 (Approximate)    Assoc Dx:  Routine general medical examination at a [facility] Reason for Today's Visit     Physical           Medical Issues Discussed Today     Routine general medical examination at a health care facility    -  Primary    Screening for iron deficiency anemia        Screening for endocrine, nutritional, metabolic an Ice cream strawberry   79 mg/1/2 cup           Orange, navel   56 mg/1 medium   Note: Calcium levels may vary depending on brand and size.   Daily calcium needs  13-22 years old: 1,300 mg  2330 years old: 1,000 mg  31-55 years old: 1,000 mg  51-70 years ol 93015. All rights reserved. This information is not intended as a substitute for professional medical care. Always follow your healthcare professional's instructions.         Preventing Osteoporosis: Staying Active  Certain factors can speed up bone loss or pressure is less than 120/80 mm Hg; yearly if your systolic blood pressure is 120 to 139 mm Hg, or your diastolic blood pressure reading is 80 to 89 mm Hg   Breast cancer All women in this age group Yearly mammogram and clinical breast exam1   Cervical can this infection or vaccine 2 doses; the second dose should be given at least 4 weeks after the first dose   Hepatitis A Women at increased risk for infection – talk with your healthcare provider 2 doses given at least 6 months apart   Hepatitis B Women at i Sexually transmitted infection prevention Women at increased risk for infection – talk with your healthcare provider At routine exams   Use of daily aspirin Women ages 54 and up in this age group who are at risk for cardiovascular health problems such as s · Being an older adult  · Having difficulty absorbing fat from your diet  · Having chronic kidney disease  · Have dark skin pigmentation  · Being a  baby  Vitamin D has many effects in the body.  You may need this test to help your provider diagnos Afterward, the site may be slightly sore.   What might affect my test results? The amount of time you spend in the sunlight, your diet, and whether you take vitamin D in supplement form can affect your vitamin D levels.  Ask your healthcare provider if any below). Also ask your healthcare provider about alternatives to the test.  · Tell your healthcare provider about any medicines you take. Also tell him or her about any health conditions you may have.   · Make sure your rectum and colon are empty for the anna marie professional's instructions. Colonoscopy     A camera attached to a flexible tube with a viewing lens is used to take video pictures.      Colonoscopy is a test to view the inside of your lower digestive tract (colon and rectum). Sometimes it can elsa · The healthcare provider will first give you a physical exam to check for anal and rectal problems. · Then the anus is lubricated and the scope inserted. · If you are awake, you may have a feeling similar to needing to have a bowel movement.  You may als You can access your MyChart to more actively manage your health care and view more details from this visit by going to [email or web address].   If you've recently had a stay at the Hospital you can access your discharge instructions in [address] by jamil